# Patient Record
Sex: MALE | Race: WHITE | NOT HISPANIC OR LATINO | Employment: FULL TIME | URBAN - METROPOLITAN AREA
[De-identification: names, ages, dates, MRNs, and addresses within clinical notes are randomized per-mention and may not be internally consistent; named-entity substitution may affect disease eponyms.]

---

## 2017-01-10 ENCOUNTER — OFFICE VISIT (OUTPATIENT)
Dept: PSYCHOLOGY | Facility: CLINIC | Age: 32
End: 2017-01-10
Attending: FAMILY MEDICINE
Payer: COMMERCIAL

## 2017-01-10 DIAGNOSIS — F90.2 ADHD (ATTENTION DEFICIT HYPERACTIVITY DISORDER), COMBINED TYPE: Primary | ICD-10-CM

## 2017-01-10 PROCEDURE — 90834 PSYTX W PT 45 MINUTES: CPT | Performed by: PSYCHOLOGIST

## 2017-01-12 PROBLEM — F90.2 ADHD (ATTENTION DEFICIT HYPERACTIVITY DISORDER), COMBINED TYPE: Status: ACTIVE | Noted: 2017-01-12

## 2017-01-12 NOTE — PROGRESS NOTES
Progress Note - Initial Session  Disclaimer: This note consists of symbols derived from keyboarding, dictation and/or voice recognition software. As a result, there may be errors in the script that have gone undetected. Please consider this when interpreting information found in this chart.    Client Name:  Jona Zafar Date: 1/10/2017         Service Type: Individual      Session Start Time: 2:00 PM  Session End Time: 2:45 PM      Session Length: 38 - 52      Session #: 1     Attendees: Client attended alone         Diagnostic Assessment in progress.  Unable to complete documentation at the conclusion of the first session due to eating more information. ADHD assessment in process. Client presents for first interview for adult ADHD assessment. Client was initially diagnosed with ADHD at age 13. At that time he reported episodes of staring, disruptive behavior. He reported good response to medications however was only on them for approximately one month as his father did not want him on medications. Client has difficulty learning new things difficulty holding a job difficulty holding his temper.  Has trouble keeping track things like cell phones keys appointments.      Mental Status Assessment:  Appearance:   Appropriate   Eye Contact:   Good   Psychomotor Behavior: Hyperactive   Attitude:   Cooperative   Orientation:   All  Speech   Rate / Production: Normal    Volume:  Normal   Mood:    Normal Sad   Affect:    Appropriate   Thought Content:  Clear   Thought Form:  Coherent  Logical   Insight:    Fair       Safety Issues and Plan for Safety and Risk Management:  Client denies current fears or concerns for personal safety.  Client denies current or recent suicidal ideation or behaviors.  Client denies current or recent homicidal ideation or behaviors.  Client denies current or recent self injurious behavior or ideation.  Client denies other safety concerns.  A safety and risk management plan has  not been developed at this time, however client was given the after-hours number / 911 should there be a change in any of these risk factors.  Client reports there are no firearms in the house.      Diagnostic Criteria:  Combined Type       DSM5 Diagnoses: (Sustained by DSM5 Criteria Listed Above)  Diagnoses: Attention-Deficit/Hyperactivity Disorder  314.01 (F90.2) Combined presentation  Psychosocial & Contextual Factors: difficulty keeping ajob  WHODAS 2.0 (12 item)            This questionnaire asks about difficulties due to health conditions. Health conditions  include  disease or illnesses, other health problems that may be short or long lasting,  injuries, mental health or emotional problems, and problems with alcohol or drugs.                     Think back over the past 30 days and answer these questions, thinking about how much  difficulty you had doing the following activities. For each question, please [place] only  one response.    S1 Standing for long periods such as 30 minutes? None =         1   S2 Taking care of household responsibilities? Severe =       4   S3 Learning a new task, for example, learning how to get to a new place? Moderate =   3   S4 How much of a problem do you have joining community activities (for example, festivals, Mosque or other activities) in the same way as anyone else can? Severe =       4   S5 How much have you been emotionally affected by your health problems? Moderate =   3     In the past 30 days, how much difficulty did you have in:   S6 Concentrating on doing something for ten minutes? Moderate =   3   S7 Walking a long distance such as a kilometer (or equivalent)? Mild =           2   S8 Washing your whole body? None =         1   S9 Getting dressed? None =         1   S10 Dealing with people you do not know? Moderate =   3   S11 Maintaining a friendship? Mild =           2   S12 Your day to day work? Extreme / or cannot do = 5     H1 Overall, in the past 30 days,  how many days were these difficulties present? Record number of days 20   H2 In the past 30 days, for how many days were you totally unable to carry out your usual activities or work because of any health condition? Record number of days  25   H3 In the past 30 days, not counting the days that you were totally unable, for how many days did you cut back or reduce your usual activities or work because of any health condition? Record number of days 30       Collateral Reports Completed:  Not Applicable      PLAN: (Homework, other):  Client stated that he may follow up for ongoing services with [facility].  Second interview scheduled      Asael Covington

## 2017-01-17 ENCOUNTER — OFFICE VISIT (OUTPATIENT)
Dept: PSYCHOLOGY | Facility: CLINIC | Age: 32
End: 2017-01-17
Attending: FAMILY MEDICINE
Payer: COMMERCIAL

## 2017-01-17 DIAGNOSIS — F90.2 ADHD (ATTENTION DEFICIT HYPERACTIVITY DISORDER), COMBINED TYPE: Primary | ICD-10-CM

## 2017-01-17 PROCEDURE — 90791 PSYCH DIAGNOSTIC EVALUATION: CPT | Performed by: PSYCHOLOGIST
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
## 2017-02-06 ENCOUNTER — DOCUMENTATION ONLY (OUTPATIENT)
Dept: PSYCHOLOGY | Facility: CLINIC | Age: 32
End: 2017-02-06
Payer: COMMERCIAL

## 2017-02-06 DIAGNOSIS — F90.2 ADHD (ATTENTION DEFICIT HYPERACTIVITY DISORDER), COMBINED TYPE: Primary | ICD-10-CM

## 2017-02-06 PROCEDURE — 96101 HC PSYCHOLOGICAL TEST BY PSYCHOLOGIST/MD, PER HR: CPT | Performed by: PSYCHOLOGIST

## 2017-02-14 ENCOUNTER — OFFICE VISIT (OUTPATIENT)
Dept: PSYCHOLOGY | Facility: CLINIC | Age: 32
End: 2017-02-14
Payer: COMMERCIAL

## 2017-02-14 DIAGNOSIS — F90.0 ADHD (ATTENTION DEFICIT HYPERACTIVITY DISORDER), INATTENTIVE TYPE: Primary | ICD-10-CM

## 2017-02-14 PROCEDURE — 90834 PSYTX W PT 45 MINUTES: CPT | Performed by: PSYCHOLOGIST

## 2017-02-14 NOTE — Clinical Note
Looks like a pretty solid diagnosis of ADHD inattentive subtype to me. Fairly moderate symptoms severity. I don't know that stimulants would be of much benefit to him, he is not reporting much of a paradoxical response to caffeine or nicotine.

## 2017-02-14 NOTE — MR AVS SNAPSHOT
"                  MRN:[id number]                      After Visit Summary   2017    Jona Zafar    MRN: [id number]           Visit Information        Provider Department      2017 10:30 AM Asael Covington [facility] ADHD      Your next 10 appointments already scheduled     2017  7:20 AM CST   [facility]ter MD   [facility]    [address] [address]   [phone]              MyChart Information     [employer] lets you send messages to your doctor, view your test results, renew your prescriptions, schedule appointments and more. To sign up, go to [email or web address] . Click on \"Log in\" on the left side of the screen, which will take you to the Welcome page. Then click on \"Sign up Now\" on the right side of the page.     You will be asked to enter the access code listed below, as well as some personal information. Please follow the directions to create your username and password.     Your access code is: [email or web address]  Expires: 2017  5:01 PM     Your access code will  in 90 days. If you need help or a new code, please call your [facility] clinic or [phone].        Care EveryWhere ID     This is your Care EveryWhere ID. This could be used by other organizations to access your [facility] medical records  [id number]        "

## 2017-02-15 ENCOUNTER — FCC EXTENDED DOCUMENTATION (OUTPATIENT)
Dept: PSYCHOLOGY | Facility: CLINIC | Age: 32
End: 2017-02-15

## 2017-02-15 PROBLEM — F90.0 ADHD (ATTENTION DEFICIT HYPERACTIVITY DISORDER), INATTENTIVE TYPE: Status: ACTIVE | Noted: 2017-02-15

## 2017-02-15 PROBLEM — F90.2 ADHD (ATTENTION DEFICIT HYPERACTIVITY DISORDER), COMBINED TYPE: Status: RESOLVED | Noted: 2017-01-12 | Resolved: 2017-02-15

## 2017-02-15 NOTE — PROGRESS NOTES
Disclaimer: This note consists of symbols derived from keyboarding, dictation and/or voice recognition software. As a result, there may be errors in the script that have gone undetected. Please consider this when interpreting information found in this chart.    Progress Note    Client Name: Jona Zafar  Date: 2/14/2017         Service Type: Individual      Session Start Time: 10:30 AM  Session End Time: 11:15 AM      Session Length: 45     Session #: 3     Attendees: Client attended alone         DATA   ADHD Assessment feedback session. Reviewed results of testing. See [facility] extended documentation for results. Explained diagnosis and treatment options. Recommended medication evaluation be scheduled with PCP. Also provided and reviewed ADHD patient handout. Pt will schedule follow-up with me after seeing PCP.   Progress Since Last Session (Related to Symptoms / Goals / Homework):   Symptoms: Stable    Homework: Achieved / completed to satisfaction      Episode of Care Goals: Satisfactory progress - PREPARATION (Decided to change - considering how); Intervened by negotiating a change plan and determining options / strategies for behavior change, identifying triggers, exploring social supports, and working towards setting a date to begin behavior change     Current / Ongoing Stressors and Concerns:   Attention and concentration difficulties work and relationship stress     Treatment Objective(s) Addressed in This Session:   feedback session reviewing results of testing       Intervention:   psychoeducation        ASSESSMENT: Current Emotional / Mental Status (status of significant symptoms):   Risk status (Self / Other harm or suicidal ideation)   Client denies current fears or concerns for personal safety.   Client denies current or recent suicidal ideation or behaviors.   Client denies current or recent homicidal ideation or behaviors.   Client denies current or recent self  injurious behavior or ideation.   Client denies other safety concerns.   A safety and risk management plan has not been developed at this time, however client was given the after-hours number / 911 should there be a change in any of these risk factors.     Appearance:   Appropriate    Eye Contact:   Good    Psychomotor Behavior: Normal    Attitude:   Cooperative    Orientation:   All   Speech    Rate / Production: Normal     Volume:  Normal    Mood:    Normal   Affect:    Appropriate    Thought Content:  Clear    Thought Form:  Coherent  Logical    Insight:    Good      Medication Review:   No changes to current psychiatric medication(s)     Medication Compliance:   Yes     Changes in Health Issues:   None reported     Chemical Use Review:   Substance Use: Chemical use reviewed, no active concerns identified      Tobacco Use: No current tobacco use.       Collateral Reports Completed:   Not Applicable    PLAN: (Client Tasks / Therapist Tasks / Other)  Client to make medication evaluation appointment with primary physician, see me approximately 4-6 weeks after starting medications        Asael Covington

## 2017-02-15 NOTE — PROGRESS NOTES
[facility]  ADHD Evaluation  Disclaimer: This note consists of symbols derived from keyboarding, dictation and/or voice recognition software. As a result, there may be errors in the script that have gone undetected. Please consider this when interpreting information found in this chart.         Patient: Jona Zafar  YOB: 1985  MRN: [id number]  Date(s) of assessment:  Diagnostic Assessment January 10 and January 17, 2017 and Jr self-report and collateral measures scored and interpreted February 6, 2017 feedback session February 14, 2017    Information about appointment:  Client attended three sessions to aid in determining client's mental health diagnosis or diagnoses and treatment recommendations that best address client concerns. Client records includingprevious evaluations were reviewed. A diagnostic assessment was conducted at the initial appointment. Client completed several rating scales to assist in assessing attention-related and other mental health symptoms that may be causing impairments in functioning. Rating scales were also completed by a collateral contact.    Assessment tools:      Jr Adult ADHD Rating Scale-IV: Self and Other Reports (BAARS-IV), Jr Functional Impairment Scale: Self and Other Reports (BFIS), Jr Deficits in Executive Functioning Scale: Self and Other Reports (BDEFS), Patient Health Questionnaire-9 (PHQ-9) and Generalized Anxiety Disorder-7 (ALLEN-7)    Assessment Results:    Behavioral Observations:  Pleasant 31-year-old  male with a previous history of ADHD diagnosis in grade school. He is reasonably attentive during our sessions and on time.    Jr Adult ADHD Rating Scale-IV: Self and Other Reports (BAARS-IV)  The BAARS-IV assesses for symptoms of ADHD that are experienced in one's daily life. This assessment measure includes self and collateral rating scales designed to provide information regarding current and  "childhood symptoms of ADHD including inattention, hyperactivity, and impulsivity. Self-report scores are reported as percentiles. Scores at the 76th-83rd percentile are considered marginal, scores at the 84th-92nd percentile are considered borderline, scores at the 93rd-95th percentile are considered mild, scores at the 96th-98th percentile are considered moderate, and those at the 99th percentile are considered severe. Collateral or \"other\" rating scales are reported as number of symptoms observed in comparison to those reported by the client. Norms and percentile scores are not available for collateral reports.     Current Symptoms Scale--Self Report:   Client completed the self-report inventory of current symptoms. The results indicate that the client's Total ADHD Score was 47 which places him in the 97th percentile for overall ADHD symptoms. In addition, the client endorsed 8/9 (98th percentile) Inattention symptoms, 3/5 (93rd percentile) Hyperactivity /Impulsivity symptoms, and 8/9 (98th percentile) Sluggish Cognitive Tempo symptoms. Client indicated that the reported symptoms have resulted in impaired functioning in school, home, work and social relationships. Overall, the results suggest the client is experiencing moderate to severe ADHD symptoms.     Current Symptoms Scale--Other Report:  Client's significant other completed the collateral report inventory of current symptoms. Based on the collateral contact's observation of symptoms, the client demonstrates 6/9 Inattention symptoms, 2/5 Hyperactivity symptoms, 0/4 Impulsivity symptoms, and 7/9 Sluggish Cognitive Tempo symptoms. The client's Total ADHD Score was 38. The collateral contact indicated the client demonstrates impaired functioning in home, work and social relationships} The collateral- and self-report scores are not significantly different.     Childhood Symptoms Scale--Self-Report:  Client completed the self-report inventory of childhood " "symptoms. The results indicate that the client's Total ADHD Score was 54 which places him in the 97th percentile for overall ADHD symptoms in childhood. In addition, the client endorsed 8/9 (98th percentile) Inattention symptoms and  6/9 (95th percentile) Hyperactivity-Impulsivity symptoms. Client indicated that the reported symptoms resulted in impaired functioning in school, home and social relationships Overall, the results suggest the client experienced  Moderate symptoms of ADHD as a child.     Childhood Symptoms Scale--Other Report:  Client's mother completed the collateral report inventory of childhood symptoms. Based on the collateral contact's recollection of client's childhood symptoms, the client demonstrated 9/9 Inattention symptoms and 6/9 Hyperactivity-Impulsivity symptoms. The client's Total ADHD Score was  59. The collateral contact indicated the client demonstrates impaired functioning in school and homeThe collateral- and self-report scores are not significantly different. Collateral report is significantly higher in hyperactivity.                          Jr Functional Impairment Scale: Self and Other Reports (BFIS)  The BFIS is used to assess an individuals' psychosocial impairment in major life/daily activities that may be due to a mental health disorder. This assessment measure includes self and collateral rating scales. Self-report scores are reported as percentiles. Scores at the 76th-83rd percentile are considered marginal, scores at the 84th-92nd percentile are considered borderline, scores at the 93rd-95th percentile are considered mild, scores at the 96th-98th percentile are considered moderate, and those at the 99th percentile are considered severe.Collateral or \"other\" rating scales are reported as number of symptoms observed in comparison to those reported by the client. Norms and percentile scores are not available for collateral reports.     Results indicate the client " "identified impairment (scores at or greater than 93rd percentile) in the following areas: home-family, home-chores, work, social-strangers, social-friends, community activities, education, marriage/cohabiting/dating, money management, daily responsibilities and health maintenance The client's Mean Impairment Score was 7.07 (98th percentile) indicating the client is reporting Moderate impairment in functioning across domains. Client's significant other completed the collateral rating scale, which indicated similar results. The collateral contact's scores were generally lower than the client's report.     Jr Deficits in Executive Functioning Scale (BDEFS)  The BDEFS is a measure used for evaluating dimensions of adult executive functioning in daily life.This assessment measure includes self and collateral rating scales. Self-report scores are reported as percentiles. Scores at the 76th-83rd percentile are considered marginal, scores at the 84th-92nd percentile are considered borderline, scores at the 93rd-95th percentile are considered mild, scores at the 96th-98th percentile are considered moderate, and those at the 99th percentile are considered severe.Collateral or \"other\" rating scales are reported as number of symptoms observed in comparison to those reported by the client. Norms and percentile scores are not available for collateral reports.     Results indicate the client's Total Executive Functioning Score was 252  (98th percentile). The ADHD-Executive Functioning Index score was 30 (97thpercentile). These scores suggest the client has Moderate deficits in executive functioning. These deficits are likely to be due to ADHD. Results indicate the client identified significant deficits in the following areas: self-management to time 99th percentile , self-organization/problem-solving 97th percentile and self-motivation 96th percentile. Client's significant other completed the collateral rating scale, which " "indicated similar results. The collateral contact's scores were generally lower than the client's report.    Generalized Anxiety Disorder Questionnaire (ALLEN-7)  This questionnaire is designed to assess for anxiety in adults.  Based on the score, he is experiencing mild symptoms of anxiety. Client identified the following symptoms of anxiety: worrying about many different things, trouble relaxing and being restless    Patient Health Questionnaire- 9 (PHQ-9)   This questionnaire is designed to assess for depression in adults.  Based on the score, he is experiencing moderate symptoms of depression. Client identified the following symptoms of depression: depressed mood, lack of interest, difficulty with sleep, poor concentration and restlessness or lethargy.         Summary (based on clinical interview, review of records, test results):  A diagnosis of ADHD requires a persistent pattern of inattention and/or hyperactivity which interferes with functioning or development in multiple areas. Additionally, several symptoms need to be present before age 12. Although life stress and situations may make ADHD more or less problematic over the lifespan one can not \"catch\" ADHD later in life if there was no evidence of it in childhood.   The client's case there is clear evidence for both inattention and hyperactivity/impulsivity in childhood.  As happens with many individuals with such a childhood presentation the hyperactive elements have receded somewhat however the inattention difficulties remain. Plan also shows evidence of difficulties with executive functioning consistent with the diagnosis of ADHD. There is some associated depression  However this is moderate and likely to be the result of prolonged consequences of untreated ADHD..    DSM5 Diagnoses: (Sustained by DSM5 Criteria Listed Above)  Diagnoses: Attention-Deficit/Hyperactivity Disorder  314.00 (F90.0) Predominantly inattentive presentation; Major Depressive " Disorder, Recurrent Episode, Mild _; depression is reported is in remission  Psychosocial & Contextual Factors: difficult relationship with family of origin  WHODAS 2.0 (12 item)  This questionnaire asks about difficulties due to health conditions. Health conditions  Include disease or illnesses, other health problems that may be short or long lasting,  injuries, mental health or emotional problems, and problems with alcohol or drugs.      Think back over the past 30 days and answer these questions, thinking about how much  difficulty you had doing the following activities. For each question, please [place] only one  response.     S1 Standing for long periods such as 30 minutes? None = 1   S2 Taking care of household responsibilities? Severe = 4   S3 Learning a new task, for example, learning how to get to a new place? Moderate = 3   S4 How much of a problem do you have joining community activities (for example, festivals, [employer] day or other activities) in the same way as anyone else can? Severe = 4   S5 How much have you been emotionally affected by your health problems? Moderate = 3           In the past 30 days, how much difficulty did you have in:   S6 Concentrating on doing something for ten minutes? Moderate = 3   S7 Walking a long distance such as a kilometer (or equivalent)? Mild = 2   S8 Washing your whole body? None = 1   S9 Getting dressed? None = 1   S10 Dealing with people you do not know? Moderate = 3   S11 Maintaining a friendship? Mild = 2   S12 Your day to day work? Extreme / or cannot do = 5      H1 Overall, in the past 30 days, how many days were these difficulties present? Record number of days 20   H2 In the past 30 days, for how many days were you totally unable to carry out your usual activities or work because of any health condition? Record number of days 25   H3 In the past 30 days, not counting the days that you were totally unable, for how many days did you cut back or reduce your usual  activities or work because of any health condition? Record number of days 30             Recommendations:    Schedule an appointment with your physician to discuss a medication evaluation., Access resources through websites, books, and articles such as those provided  in the handout., Consider working with an ADHD  or individual therapist to learn skills to  assist with symptom management, as well as ways to improve relationships,  etc that may have been impacted by your symptoms. , Consider attending workshops or support groups through [email or web address] and Schedule a follow-up appointment with me in about six weeks to review symptoms, treatment involvement, and struggles and/or successes.     Given the predominantly inattentive presentation  of ADHD and this client medications other than stimulants might be appropriate.    Asael Covington

## 2017-02-17 ENCOUNTER — OFFICE VISIT (OUTPATIENT)
Dept: FAMILY MEDICINE | Facility: CLINIC | Age: 32
End: 2017-02-17
Payer: COMMERCIAL

## 2017-02-17 VITALS
BODY MASS INDEX: 26.17 KG/M2 | HEART RATE: 62 BPM | WEIGHT: 182.8 LBS | HEIGHT: 70 IN | SYSTOLIC BLOOD PRESSURE: 140 MMHG | DIASTOLIC BLOOD PRESSURE: 78 MMHG | OXYGEN SATURATION: 100 % | TEMPERATURE: 97.5 F

## 2017-02-17 DIAGNOSIS — F32.5 MAJOR DEPRESSIVE DISORDER WITH SINGLE EPISODE, IN FULL REMISSION (H): ICD-10-CM

## 2017-02-17 DIAGNOSIS — F90.0 ADHD (ATTENTION DEFICIT HYPERACTIVITY DISORDER), INATTENTIVE TYPE: Primary | ICD-10-CM

## 2017-02-17 PROCEDURE — 99214 OFFICE O/P EST MOD 30 MIN: CPT | Performed by: FAMILY MEDICINE

## 2017-02-17 RX ORDER — DEXTROAMPHETAMINE SACCHARATE, AMPHETAMINE ASPARTATE, DEXTROAMPHETAMINE SULFATE AND AMPHETAMINE SULFATE 2.5; 2.5; 2.5; 2.5 MG/1; MG/1; MG/1; MG/1
10 TABLET ORAL 2 TIMES DAILY
Qty: 60 TABLET | Refills: 0 | Status: SHIPPED | OUTPATIENT
Start: 2017-02-17 | End: 2017-03-17

## 2017-02-17 NOTE — NURSING NOTE
"Chief Complaint   Patient presents with     YOMIHAMILCAR     recently seen by Dr. Covington, wants to discuss further treatment from here       Initial /78 (Cuff Size: Adult Regular)  Pulse 62  Temp 97.5  F (36.4  C) (Tympanic)  Ht 5' 10\" (1.778 m)  Wt 182 lb 12.8 oz (82.9 kg)  SpO2 100%  BMI 26.23 kg/m2 Estimated body mass index is 26.23 kg/(m^2) as calculated from the following:    Height as of this encounter: 5' 10\" (1.778 m).    Weight as of this encounter: 182 lb 12.8 oz (82.9 kg).  Medication Reconciliation: complete  "

## 2017-02-17 NOTE — MR AVS SNAPSHOT
After Visit Summary   2/17/2017    Jona Zafar    MRN: [id number]           Patient Information     Date Of Birth          1985        Visit Information        Provider Department      2/17/2017 7:20 AM Mick Richter MD [facility]        Today's Diagnoses     ADHD (attention deficit hyperactivity disorder), inattentive type    -  1      Care Instructions      Start ADHD Adderall medication one pill in the morning and one when you notice it wearing off about noon.    Next clinic visit with Dr. Richter in 3-4 weeks    Thank you for choosing [facility].  You may be receiving a survey in the mail from [employer] regarding your visit today.  Please take a few minutes to complete and return the survey to let us know how we are doing.      If you have questions or concerns, please contact us via [employer] or you can contact your care team at [phone].    Our Clinic hours are:  Monday 6:40 am  to 7:00 pm  Tuesday -Friday 6:40 am to 5:00 pm    The [place] outpatient lab hours are:  Monday - Friday 6:10 am to 4:45 pm  Saturdays 7:00 am to 11:00 am  Appointments are required, call [phone]    If you have clinical questions after hours or would like to schedule an appointment,  call the clinic at [phone].        Follow-ups after your visit        Who to contact     If you have questions or need follow up information about today's clinic visit or your schedule please contact [facility] directly at [phone].  Normal or non-critical lab and imaging results will be communicated to you by [employer], letter or phone within 4 business days after the clinic has received the results. If you do not hear from us within 7 days, please contact the clinic through [employer] or phone. If you have a critical or abnormal lab result, we will notify you by phone as soon as possible.  Submit refill requests through [employer] or call your pharmacy and they will forward the refill  "request to us. Please allow 3 business days for your refill to be completed.          Additional Information About Your Visit        [employer] Information     [employer] lets you send messages to your doctor, view your test results, renew your prescriptions, schedule appointments and more. To sign up, go to [email or web address] . Click on \"Log in\" on the left side of the screen, which will take you to the Welcome page. Then click on \"Sign up Now\" on the right side of the page.     You will be asked to enter the access code listed below, as well as some personal information. Please follow the directions to create your username and password.     Your access code is: [email or web address]  Expires: 2017  5:01 PM     Your access code will  in 90 days. If you need help or a new code, please call your [place] clinic or [phone].        Care EveryWhere ID     This is your Care EveryWhere ID. This could be used by other organizations to access your [place] medical records  [id number]        Your Vitals Were     Pulse Temperature Height Pulse Oximetry BMI (Body Mass Index)       62 97.5  F (36.4  C) (Tympanic) 5' 10\" (1.778 m) 100% 26.23 kg/m2        Blood Pressure from Last 3 Encounters:   17 140/78   10/14/16 129/76   16 131/74    Weight from Last 3 Encounters:   17 182 lb 12.8 oz (82.9 kg)   10/14/16 185 lb 12.8 oz (84.3 kg)   16 186 lb 9.6 oz (84.6 kg)              Today, you had the following     No orders found for display         Today's Medication Changes          These changes are accurate as of: 17  8:06 AM.  If you have any questions, ask your nurse or doctor.               Start taking these medicines.        Dose/Directions    amphetamine-dextroamphetamine 10 MG per tablet   Commonly known as:  ADDERALL   Used for:  ADHD (attention deficit hyperactivity disorder), inattentive type   Started by:  Mick Richter MD        Dose:  10 mg   Take 1 tablet (10 mg) by mouth 2 " times daily   Quantity:  60 tablet   Refills:  0            Where to get your medicines      Some of these will need a paper prescription and others can be bought over the counter.  Ask your nurse if you have questions.     Bring a paper prescription for each of these medications     amphetamine-dextroamphetamine 10 MG per tablet                Primary Care Provider Office Phone # Fax #    Mick Richter [phone]       [facility] [address] [address]        Thank you!     Thank you for choosing [facility]  for your care. Our goal is always to provide you with excellent care. Hearing back from our patients is one way we can continue to improve our services. Please take a few minutes to complete the written survey that you may receive in the mail after your visit with us. Thank you!             Your Updated Medication List - Protect others around you: Learn how to safely use, store and throw away your medicines at www.disposemymeds.org.          This list is accurate as of: 2/17/17  8:06 AM.  Always use your most recent med list.                   Brand Name Dispense Instructions for use    amphetamine-dextroamphetamine 10 MG per tablet    ADDERALL    60 tablet    Take 1 tablet (10 mg) by mouth 2 times daily

## 2017-02-17 NOTE — PROGRESS NOTES
"  SUBJECTIVE:                                                    Jona Zafar is a 31 year old male who presents to clinic today for the following health issues:      Chief Complaint   Patient presents with     A.PHILH.FAVIAN     recently seen by Dr. Covington, wants to discuss further treatment from here     Did go through ADHD eval 2/14/17 with Dr. Covington and diagnosed with ADHD and medication recommended.   Symptoms of ADHD around age 10 and diagnosed but only on medications for few weeks and dad didn't want him on medication.       Problem list and histories reviewed & adjusted, as indicated.  Additional history: as documented    Problem list, Medication list, Allergies, and Medical/Social/Surgical histories [facility] and updated as appropriate.    ROS:  C: NEGATIVE for fever, chills, change in weight  E/M: NEGATIVE for ear, mouth and throat problems  R: NEGATIVE for significant cough or SOB  CV: NEGATIVE for chest pain, palpitations or peripheral edema    OBJECTIVE:                                                    /78 (Cuff Size: Adult Regular)  Pulse 62  Temp 97.5  F (36.4  C) (Tympanic)  Ht 5' 10\" (1.778 m)  Wt 182 lb 12.8 oz (82.9 kg)  SpO2 100%  BMI 26.23 kg/m2  Body mass index is 26.23 kg/(m^2).  GENERAL: healthy, alert and no distress  CV: regular rate and rhythm, normal S1 S2, no S3 or S4, no murmur, click or rub, no peripheral edema and peripheral pulses strong  PSYCH: mentation appears normal, affect normal/bright    Diagnostic Test Results:  none      ASSESSMENT/PLAN:                                                        Jona was seen today for a.d.h.favian.    Diagnoses and all orders for this visit:    ADHD (attention deficit hyperactivity disorder), inattentive type: new diagnosis with Asael Covington  --discussed risks, benefits, and side effects of this new medication. Patient understands and is in agreement.    -     amphetamine-dextroamphetamine (ADDERALL) 10 MG per tablet; Take 1 tablet (10 " mg) by mouth 2 times daily    Major Depression in full remission: stable mood    Next clinic visit in 3-4 weeks    Mick Richter MD  [facility]

## 2017-02-17 NOTE — PATIENT INSTRUCTIONS
Start ADHD Adderall medication one pill in the morning and one when you notice it wearing off about noon.    Next clinic visit with Dr. Richter in 3-4 weeks    Thank you for choosing [facility].  You may be receiving a survey in the mail from Rachel Figueroa regarding your visit today.  Please take a few minutes to complete and return the survey to let us know how we are doing.      If you have questions or concerns, please contact us via [employer] or you can contact your care team at [phone].    Our Clinic hours are:  Monday 6:40 am  to 7:00 pm  Tuesday -Friday 6:40 am to 5:00 pm    The [place] outpatient lab hours are:  Monday - Friday 6:10 am to 4:45 pm  Saturdays 7:00 am to 11:00 am  Appointments are required, call [phone]    If you have clinical questions after hours or would like to schedule an appointment,  call the clinic at [phone].

## 2017-03-01 ENCOUNTER — TELEPHONE (OUTPATIENT)
Dept: FAMILY MEDICINE | Facility: CLINIC | Age: 32
End: 2017-03-01

## 2017-03-01 NOTE — TELEPHONE ENCOUNTER
Dr. Richter,    Patient calling about adjusting his adderall dose.  Patient seen in office on 2/17/17.  Just started medication then.  Patient states he gets side tracked easily.  Seems to be worn off by 10 a.m. Takes his 2nd dose at noon.  Helps but not like it was in the beginning.  Patient is watching his diet, has no counselor.  Has an appt on 3/17/17 is wondering if you can make adjustment before hand.  Please advise. Reina MORSE RN

## 2017-03-01 NOTE — TELEPHONE ENCOUNTER
Reason for Call:  Other medication adjustment    Detailed comments: pt stated that he is wondering if he needs to adjust his dose higher. He states that if feels like it wears off too soon and he has trouble staying on task. He has noticed this in the last 2-3 days.    Phone Number Patient can be reached at: Home number on file [phone] (home)    Best Time: any    Can we leave a detailed message on this number? YES    Call taken on 3/1/2017 at 3:33 PM by Josie Vargas

## 2017-03-17 ENCOUNTER — OFFICE VISIT (OUTPATIENT)
Dept: FAMILY MEDICINE | Facility: CLINIC | Age: 32
End: 2017-03-17
Payer: COMMERCIAL

## 2017-03-17 VITALS
HEART RATE: 58 BPM | SYSTOLIC BLOOD PRESSURE: 129 MMHG | BODY MASS INDEX: 26.2 KG/M2 | WEIGHT: 183 LBS | TEMPERATURE: 98 F | HEIGHT: 70 IN | DIASTOLIC BLOOD PRESSURE: 76 MMHG | OXYGEN SATURATION: 100 %

## 2017-03-17 DIAGNOSIS — F90.0 ADHD (ATTENTION DEFICIT HYPERACTIVITY DISORDER), INATTENTIVE TYPE: ICD-10-CM

## 2017-03-17 DIAGNOSIS — Z71.6 ENCOUNTER FOR SMOKING CESSATION COUNSELING: Primary | ICD-10-CM

## 2017-03-17 PROCEDURE — 99214 OFFICE O/P EST MOD 30 MIN: CPT | Performed by: FAMILY MEDICINE

## 2017-03-17 RX ORDER — DEXTROAMPHETAMINE SACCHARATE, AMPHETAMINE ASPARTATE, DEXTROAMPHETAMINE SULFATE AND AMPHETAMINE SULFATE 3.75; 3.75; 3.75; 3.75 MG/1; MG/1; MG/1; MG/1
15 TABLET ORAL 2 TIMES DAILY
Qty: 60 TABLET | Refills: 0 | Status: SHIPPED | OUTPATIENT
Start: 2017-03-17 | End: 2017-04-18

## 2017-03-17 RX ORDER — NICOTINE 21 MG/24HR
1 PATCH, TRANSDERMAL 24 HOURS TRANSDERMAL EVERY 24 HOURS
Qty: 21 PATCH | Refills: 0 | Status: SHIPPED | OUTPATIENT
Start: 2017-04-14 | End: 2017-10-13

## 2017-03-17 RX ORDER — NICOTINE 21 MG/24HR
1 PATCH, TRANSDERMAL 24 HOURS TRANSDERMAL EVERY 24 HOURS
Qty: 28 PATCH | Refills: 0 | Status: SHIPPED | OUTPATIENT
Start: 2017-03-17 | End: 2017-04-14

## 2017-03-17 NOTE — PROGRESS NOTES
"  SUBJECTIVE:                                                    Jona Zafar is a 31 year old male who presents to clinic today for the following health issues:      Medication Followup of Adderall    Taking Medication as prescribed: yes    Side Effects:  None    Medication Helping Symptoms:  Does help but may need to have an increase in dosage   Did notice improvement with starting adderall 10mg BID, will catch himself getting distracted.    Smoking: has cut down to 1/2 to 3/4 pack per day  Has quit once for 3 years cold turkey.     Problem list and histories reviewed & adjusted, as indicated.  Additional history: as documented      Reviewed and updated as needed this visit by clinical staff  Tobacco  Allergies  Med Hx  Surg Hx  Fam Hx  Soc Hx      Reviewed and updated as needed this visit by Provider              Review of Systems:   EYES: no acute vision problems or changes  CV: no chest pain, no palpitations, no new or worsening peripheral edema  GI: no nausea, no vomiting, no constipation, no diarrhea  NEURO: no weakness, no dizziness, no syncope, no headaches, no tremors, no tics  PSYCHIATRIC: NEGATIVE for changes in mood or affect  CONSTITUTIONAL: no fever, no chills, no fatigue, no unexpected change in weight, no appetite changes.  SKIN: no worrisome rashes, no worrisome lesions       Current Outpatient Prescriptions   Medication Sig Dispense Refill     amphetamine-dextroamphetamine (ADDERALL) 15 MG per tablet Take 1 tablet (15 mg) by mouth 2 times daily 60 tablet 0      No Known Allergies       Physical Exam:     Vitals:    03/17/17 1303   BP: 129/76   Pulse: 58   Temp: 98  F (36.7  C)   TempSrc: Tympanic   SpO2: 100%   Weight: 183 lb (83 kg)   Height: 5' 10\" (1.778 m)     Body mass index is 26.26 kg/(m^2).  GENERAL:: healthy, alert and no distress  CV: regular rates and rhythm, normal S1 S2, no S3 or S4 and no murmur, no click or rub   NEURO: strength and tone- normal, sensory exam- grossly normal, " no tremor.  PSYCH: Alert and oriented times 3; speech- coherent , normal rate and volume; able to articulate logical thoughts, able to abstract reason, no tangential thoughts, no hallucinations or delusions, affect- normal  MENTAL STATUS EXAM:  Appearance/Behavior: No apparent distress, Casually groomed and Appears stated age  Speech: Normal  Mood/Affect: normal affect  Insight: Adequate  Assessment and Plan   Jona was seen today for recheck medication.    Diagnoses and all orders for this visit:    Encounter for smoking cessation counseling  -     nicotine (NICODERM CQ) 21 MG/24HR 24 hr patch; Place 1 patch onto the skin every 24 hours for 28 days  -     nicotine (NICODERM CQ) 14 MG/24HR 24 hr patch; Place 1 patch onto the skin every 24 hours  -     nicotine polacrilex (NICORETTE) 2 MG gum; Place 1 each (2 mg) inside cheek as needed for smoking cessation    ADHD (attention deficit hyperactivity disorder), inattentive type: improving control  -increase adderall to 15mg BID up from 10mg BID  -     amphetamine-dextroamphetamine (ADDERALL) 15 MG per tablet; Take 1 tablet (15 mg) by mouth 2 times daily        Options for treatment and follow-up care were reviewed with the patient and/or guardian. Jona SHERMAN Charisma and/or guardian engaged in the decision making process and verbalized understanding of the options discussed and agreed with the final plan.    Mick Richter MD  [facility]

## 2017-03-17 NOTE — MR AVS SNAPSHOT
After Visit Summary   3/17/2017    Jona Zafar    MRN: [id number]           Patient Information     Date Of Birth          1985        Visit Information        Provider Department      3/17/2017 1:00 PM Mick Richter MD [facility]        Today's Diagnoses     Encounter for smoking cessation counseling    -  1    ADHD (attention deficit hyperactivity disorder), inattentive type          Care Instructions      Can check with Quit Plan to see what options there are for the patch or the gum    Pick a quit date, tell everyone  The night you get rid of all smoking stuff  That night or next morning put on the patch  Use the gum if needed like at night        Thank you for choosing [facility].  You may be receiving a survey in the mail from [facility] regarding your visit today.  Please take a few minutes to complete and return the survey to let us know how we are doing.      If you have questions or concerns, please contact us via [employer] or you can contact your care team at [phone].    Our Clinic hours are:  Monday 6:40 am  to 7:00 pm  Tuesday -Friday 6:40 am to 5:00 pm    The [place] outpatient lab hours are:  Monday - Friday 6:10 am to 4:45 pm  Saturdays 7:00 am to 11:00 am  Appointments are required, call [phone]    If you have clinical questions after hours or would like to schedule an appointment,  call the clinic at [phone].        Follow-ups after your visit        Who to contact     If you have questions or need follow up information about today's clinic visit or your schedule please contact [facility] directly at [phone].  Normal or non-critical lab and imaging results will be communicated to you by MyChart, letter or phone within 4 business days after the clinic has received the results. If you do not hear from us within 7 days, please contact the clinic through [employer] or phone. If you have a critical or abnormal lab result, we  "will notify you by phone as soon as possible.  Submit refill requests through [employer] or call your pharmacy and they will forward the refill request to us. Please allow 3 business days for your refill to be completed.          Additional Information About Your Visit        [employer] Information     [employer] lets you send messages to your doctor, view your test results, renew your prescriptions, schedule appointments and more. To sign up, go to [email or web address] . Click on \"Log in\" on the left side of the screen, which will take you to the Welcome page. Then click on \"Sign up Now\" on the right side of the page.     You will be asked to enter the access code listed below, as well as some personal information. Please follow the directions to create your username and password.     Your access code is: [email or web address]  Expires: 2017  6:01 PM     Your access code will  in 90 days. If you need help or a new code, please call your [place] clinic or [phone].        Care EveryWhere ID     This is your Care EveryWhere ID. This could be used by other organizations to access your [place] medical records  [id number]        Your Vitals Were     Pulse Temperature Height Pulse Oximetry BMI (Body Mass Index)       58 98  F (36.7  C) (Tympanic) 5' 10\" (1.778 m) 100% 26.26 kg/m2        Blood Pressure from Last 3 Encounters:   17 129/76   17 140/78   10/14/16 129/76    Weight from Last 3 Encounters:   17 183 lb (83 kg)   17 182 lb 12.8 oz (82.9 kg)   10/14/16 185 lb 12.8 oz (84.3 kg)              Today, you had the following     No orders found for display         Today's Medication Changes          These changes are accurate as of: 3/17/17  1:29 PM.  If you have any questions, ask your nurse or doctor.               Start taking these medicines.        Dose/Directions    amphetamine-dextroamphetamine 15 MG per tablet   Commonly known as:  ADDERALL   Used for:  ADHD (attention deficit " hyperactivity disorder), inattentive type   Replaces:  amphetamine-dextroamphetamine 10 MG per tablet   Started by:  Mick Richter MD        Dose:  15 mg   Take 1 tablet (15 mg) by mouth 2 times daily   Quantity:  60 tablet   Refills:  0       * nicotine 21 MG/24HR 24 hr patch   Commonly known as:  NICODERM CQ   Used for:  Encounter for smoking cessation counseling   Started by:  Mick Richter MD        Dose:  1 patch   Place 1 patch onto the skin every 24 hours for 28 days   Quantity:  28 patch   Refills:  0       * nicotine 14 MG/24HR 24 hr patch   Commonly known as:  NICODERM CQ   Used for:  Encounter for smoking cessation counseling   Started by:  Mick Richter MD        Dose:  1 patch   Start taking on:  4/14/2017   Place 1 patch onto the skin every 24 hours   Quantity:  21 patch   Refills:  0       nicotine polacrilex 2 MG gum   Commonly known as:  NICORETTE   Used for:  Encounter for smoking cessation counseling   Started by:  Mick Richter MD        Dose:  2 mg   Place 1 each (2 mg) inside cheek as needed for smoking cessation   Quantity:  100 tablet   Refills:  0       * Notice:  This list has 2 medication(s) that are the same as other medications prescribed for you. Read the directions carefully, and ask your doctor or other care provider to review them with you.      Stop taking these medicines if you haven't already. Please contact your care team if you have questions.     amphetamine-dextroamphetamine 10 MG per tablet   Commonly known as:  ADDERALL   Replaced by:  amphetamine-dextroamphetamine 15 MG per tablet   Stopped by:  Mick Richter MD                Where to get your medicines      These medications were sent to [facility] [place], [place] - [address]  [address] [address]     Phone:  [phone]     nicotine 14 MG/24HR 24 hr patch    nicotine 21 MG/24HR 24 hr patch    nicotine polacrilex 2 MG gum         Some of these will  need a paper prescription and others can be bought over the counter.  Ask your nurse if you have questions.     Bring a paper prescription for each of these medications     amphetamine-dextroamphetamine 15 MG per tablet                Primary Care Provider Office Phone # Fax #    Mick Richter [phone]       [facility] [address] [address]        Thank you!     Thank you for choosing [facility]  for your care. Our goal is always to provide you with excellent care. Hearing back from our patients is one way we can continue to improve our services. Please take a few minutes to complete the written survey that you may receive in the mail after your visit with us. Thank you!             Your Updated Medication List - Protect others around you: Learn how to safely use, store and throw away your medicines at www.disposemymeds.org.          This list is accurate as of: 3/17/17  1:29 PM.  Always use your most recent med list.                   Brand Name Dispense Instructions for use    amphetamine-dextroamphetamine 15 MG per tablet    ADDERALL    60 tablet    Take 1 tablet (15 mg) by mouth 2 times daily       * nicotine 21 MG/24HR 24 hr patch    NICODERM CQ    28 patch    Place 1 patch onto the skin every 24 hours for 28 days       * nicotine 14 MG/24HR 24 hr patch   Start taking on:  4/14/2017    NICODERM CQ    21 patch    Place 1 patch onto the skin every 24 hours       nicotine polacrilex 2 MG gum    NICORETTE    100 tablet    Place 1 each (2 mg) inside cheek as needed for smoking cessation       * Notice:  This list has 2 medication(s) that are the same as other medications prescribed for you. Read the directions carefully, and ask your doctor or other care provider to review them with you.

## 2017-03-17 NOTE — NURSING NOTE
"Chief Complaint   Patient presents with     Recheck Medication     Adderall; wants to discuss dosage - may need to be increased       Initial /76  Pulse 58  Temp 98  F (36.7  C) (Tympanic)  Ht 5' 10\" (1.778 m)  Wt 183 lb (83 kg)  SpO2 100%  BMI 26.26 kg/m2 Estimated body mass index is 26.26 kg/(m^2) as calculated from the following:    Height as of this encounter: 5' 10\" (1.778 m).    Weight as of this encounter: 183 lb (83 kg).  Medication Reconciliation: complete  "

## 2017-04-18 ENCOUNTER — OFFICE VISIT (OUTPATIENT)
Dept: FAMILY MEDICINE | Facility: CLINIC | Age: 32
End: 2017-04-18
Payer: COMMERCIAL

## 2017-04-18 VITALS
TEMPERATURE: 98.4 F | HEART RATE: 60 BPM | BODY MASS INDEX: 26.71 KG/M2 | HEIGHT: 70 IN | WEIGHT: 186.6 LBS | OXYGEN SATURATION: 98 % | SYSTOLIC BLOOD PRESSURE: 128 MMHG | DIASTOLIC BLOOD PRESSURE: 74 MMHG

## 2017-04-18 DIAGNOSIS — F32.5 MAJOR DEPRESSIVE DISORDER WITH SINGLE EPISODE, IN FULL REMISSION (H): ICD-10-CM

## 2017-04-18 DIAGNOSIS — F90.0 ADHD (ATTENTION DEFICIT HYPERACTIVITY DISORDER), INATTENTIVE TYPE: Primary | ICD-10-CM

## 2017-04-18 PROCEDURE — 99213 OFFICE O/P EST LOW 20 MIN: CPT | Performed by: FAMILY MEDICINE

## 2017-04-18 RX ORDER — DEXTROAMPHETAMINE SACCHARATE, AMPHETAMINE ASPARTATE, DEXTROAMPHETAMINE SULFATE AND AMPHETAMINE SULFATE 3.75; 3.75; 3.75; 3.75 MG/1; MG/1; MG/1; MG/1
15 TABLET ORAL 2 TIMES DAILY
Qty: 60 TABLET | Refills: 0 | Status: CANCELLED | OUTPATIENT
Start: 2017-04-18

## 2017-04-18 RX ORDER — DEXTROAMPHETAMINE SACCHARATE, AMPHETAMINE ASPARTATE, DEXTROAMPHETAMINE SULFATE AND AMPHETAMINE SULFATE 3.75; 3.75; 3.75; 3.75 MG/1; MG/1; MG/1; MG/1
15 TABLET ORAL 2 TIMES DAILY
Qty: 60 TABLET | Refills: 0 | Status: SHIPPED | OUTPATIENT
Start: 2017-05-19 | End: 2017-06-18

## 2017-04-18 RX ORDER — DEXTROAMPHETAMINE SACCHARATE, AMPHETAMINE ASPARTATE, DEXTROAMPHETAMINE SULFATE AND AMPHETAMINE SULFATE 3.75; 3.75; 3.75; 3.75 MG/1; MG/1; MG/1; MG/1
15 TABLET ORAL 2 TIMES DAILY
Qty: 60 TABLET | Refills: 0 | Status: SHIPPED | OUTPATIENT
Start: 2017-06-19 | End: 2017-07-19

## 2017-04-18 RX ORDER — DEXTROAMPHETAMINE SACCHARATE, AMPHETAMINE ASPARTATE, DEXTROAMPHETAMINE SULFATE AND AMPHETAMINE SULFATE 3.75; 3.75; 3.75; 3.75 MG/1; MG/1; MG/1; MG/1
15 TABLET ORAL 2 TIMES DAILY
Qty: 60 TABLET | Refills: 0 | Status: SHIPPED | OUTPATIENT
Start: 2017-04-18 | End: 2017-05-18

## 2017-04-18 NOTE — PATIENT INSTRUCTIONS
Next clinic visit in 3 months for next ADHD refills    Set another quit date to quit smoking.      Thank you for choosing [facility].  You may be receiving a survey in the mail from Rachel Figueroa regarding your visit today.  Please take a few minutes to complete and return the survey to let us know how we are doing.      If you have questions or concerns, please contact us via [employer] or you can contact your care team at [phone].    Our Clinic hours are:  Monday 6:40 am  to 7:00 pm  Tuesday -Friday 6:40 am to 5:00 pm    The [place] outpatient lab hours are:  Monday - Friday 6:10 am to 4:45 pm  Saturdays 7:00 am to 11:00 am  Appointments are required, call [phone]    If you have clinical questions after hours or would like to schedule an appointment,  call the clinic at [phone].

## 2017-04-18 NOTE — NURSING NOTE
"Chief Complaint   Patient presents with     Recheck Medication       Initial /74  Pulse 60  Temp 98.4  F (36.9  C) (Tympanic)  Ht 5' 10\" (1.778 m)  Wt 186 lb 9.6 oz (84.6 kg)  SpO2 98%  BMI 26.77 kg/m2 Estimated body mass index is 26.77 kg/(m^2) as calculated from the following:    Height as of this encounter: 5' 10\" (1.778 m).    Weight as of this encounter: 186 lb 9.6 oz (84.6 kg).  Medication Reconciliation: complete  "

## 2017-04-18 NOTE — PROGRESS NOTES
"  SUBJECTIVE:                                                    Jona Zafar is a 32 year old male who presents to clinic today for the following health issues:      Medication Followup of Adderall    Taking Medication as prescribed: yes    Side Effects:  None    Medication Helping Symptoms:  yes     At last visit increased from 10 up to 15mg BID.   Did go through ADHD eval 2/14/17 with Dr. Covington and diagnosed with ADHD and medication recommended.   Symptoms of ADHD around age 10 and diagnosed but only on medications for few weeks and dad didn't want him on medication.     Problem list and histories reviewed & adjusted, as indicated.  Additional history: as documented    Reviewed and updated as needed this visit by clinical staff  Tobacco  Allergies  Med Hx  Surg Hx  Fam Hx  Soc Hx      Reviewed and updated as needed this visit by Provider         ROS:  C: NEGATIVE for fever, chills, change in weight, no appetite changes  E/M: NEGATIVE for ear, mouth and throat problems  R: NEGATIVE for significant cough or SOB  CV: NEGATIVE for chest pain, palpitations or peripheral edema  NEURO: no tremor or tics.    OBJECTIVE:                                                    /74  Pulse 60  Temp 98.4  F (36.9  C) (Tympanic)  Ht 5' 10\" (1.778 m)  Wt 186 lb 9.6 oz (84.6 kg)  SpO2 98%  BMI 26.77 kg/m2  Body mass index is 26.77 kg/(m^2).  GENERAL: healthy, alert and no distress  CV: regular rate and rhythm, normal S1 S2, no S3 or S4, no murmur, click or rub, no peripheral edema and peripheral pulses strong  NEURO: Normal strength and tone, mentation intact and speech normal  PSYCH: mentation appears normal, affect normal/bright    Diagnostic Test Results:  none      ASSESSMENT/PLAN:                                                        Jona was seen today for recheck medication.    Diagnoses and all orders for this visit:    ADHD (attention deficit hyperactivity disorder), inattentive type  -     " amphetamine-dextroamphetamine (ADDERALL) 15 MG per tablet; Take 1 tablet (15 mg) by mouth 2 times daily  -     amphetamine-dextroamphetamine (ADDERALL) 15 MG per tablet; Take 1 tablet (15 mg) by mouth 2 times daily  -     amphetamine-dextroamphetamine (ADDERALL) 15 MG per tablet; Take 1 tablet (15 mg) by mouth 2 times daily    Major depressive disorder with single episode, in full remission (H): well controlled off zoloft (had low libido side effects and now that ADHD controlled, mood improved too)      Patient Instructions   Next clinic visit in 3 months for next ADHD refills    Set another quit date to quit smoking.        Mick Richter MD  [facility]

## 2017-04-18 NOTE — MR AVS SNAPSHOT
After Visit Summary   4/18/2017    Jona Zafar    MRN: [id number]           Patient Information     Date Of Birth          1985        Visit Information        Provider Department      4/18/2017 9:40 AM Mick Richter MD [facility]        Today's Diagnoses     ADHD (attention deficit hyperactivity disorder), inattentive type    -  1    Major depressive disorder with single episode, in full remission (H)          Care Instructions    Next clinic visit in 3 months for next ADHD refills    Set another quit date to quit smoking.      Thank you for choosing [facility].  You may be receiving a survey in the mail from [employer] regarding your visit today.  Please take a few minutes to complete and return the survey to let us know how we are doing.      If you have questions or concerns, please contact us via Teabox or you can contact your care team at [phone].    Our Clinic hours are:  Monday 6:40 am  to 7:00 pm  Tuesday -Friday 6:40 am to 5:00 pm    The [place] outpatient lab hours are:  Monday - Friday 6:10 am to 4:45 pm  Saturdays 7:00 am to 11:00 am  Appointments are required, call [phone]    If you have clinical questions after hours or would like to schedule an appointment,  call the clinic at [phone].        Follow-ups after your visit        Who to contact     If you have questions or need follow up information about today's clinic visit or your schedule please contact [facility] directly at [phone].  Normal or non-critical lab and imaging results will be communicated to you by [employer], letter or phone within 4 business days after the clinic has received the results. If you do not hear from us within 7 days, please contact the clinic through "[employer]t or phone. If you have a critical or abnormal lab result, we will notify you by phone as soon as possible.  Submit refill requests through Teabox or call your pharmacy and they will  "forward the refill request to us. Please allow 3 business days for your refill to be completed.          Additional Information About Your Visit        Un[employer]xd[employer]-Sec[employer] Information     [employer] lets you send messages to your doctor, view your test results, renew your prescriptions, schedule appointments and more. To sign up, go to [email or web address] . Click on \"Log in\" on the left side of the screen, which will take you to the Welcome page. Then click on \"Sign up Now\" on the right side of the page.     You will be asked to enter the access code listed below, as well as some personal information. Please follow the directions to create your username and password.     Your access code is: [email or web address]  Expires: 2017  6:01 PM     Your access code will  in 90 days. If you need help or a new code, please call your [place] clinic or [phone].        Care EveryWhere ID     This is your Care EveryWhere ID. This could be used by other organizations to access your [place] medical records  [id number]        Your Vitals Were     Pulse Temperature Height Pulse Oximetry BMI (Body Mass Index)       60 98.4  F (36.9  C) (Tympanic) 5' 10\" (1.778 m) 98% 26.77 kg/m2        Blood Pressure from Last 3 Encounters:   17 128/74   17 129/76   17 140/78    Weight from Last 3 Encounters:   17 186 lb 9.6 oz (84.6 kg)   17 183 lb (83 kg)   17 182 lb 12.8 oz (82.9 kg)              Today, you had the following     No orders found for display         Today's Medication Changes          These changes are accurate as of: 17 10:06 AM.  If you have any questions, ask your nurse or doctor.               These medicines have changed or have updated prescriptions.        Dose/Directions    * amphetamine-dextroamphetamine 15 MG per tablet   Commonly known as:  ADDERALL   This may have changed:  Another medication with the same name was added. Make sure you understand how and when to take each.   Used " for:  ADHD (attention deficit hyperactivity disorder), inattentive type   Changed by:  Mick Richter MD        Dose:  15 mg   Take 1 tablet (15 mg) by mouth 2 times daily   Quantity:  60 tablet   Refills:  0       * amphetamine-dextroamphetamine 15 MG per tablet   Commonly known as:  ADDERALL   This may have changed:  You were already taking a medication with the same name, and this prescription was added. Make sure you understand how and when to take each.   Used for:  ADHD (attention deficit hyperactivity disorder), inattentive type   Changed by:  Mick Richter MD        Dose:  15 mg   Take 1 tablet (15 mg) by mouth 2 times daily   Quantity:  60 tablet   Refills:  0       * amphetamine-dextroamphetamine 15 MG per tablet   Commonly known as:  ADDERALL   This may have changed:  You were already taking a medication with the same name, and this prescription was added. Make sure you understand how and when to take each.   Used for:  ADHD (attention deficit hyperactivity disorder), inattentive type   Changed by:  Mick Richter MD        Dose:  15 mg   Start taking on:  5/19/2017   Take 1 tablet (15 mg) by mouth 2 times daily   Quantity:  60 tablet   Refills:  0       * amphetamine-dextroamphetamine 15 MG per tablet   Commonly known as:  ADDERALL   This may have changed:  You were already taking a medication with the same name, and this prescription was added. Make sure you understand how and when to take each.   Used for:  ADHD (attention deficit hyperactivity disorder), inattentive type   Changed by:  Mick Richter MD        Dose:  15 mg   Start taking on:  6/19/2017   Take 1 tablet (15 mg) by mouth 2 times daily   Quantity:  60 tablet   Refills:  0       * Notice:  This list has 4 medication(s) that are the same as other medications prescribed for you. Read the directions carefully, and ask your doctor or other care provider to review them with you.         Where to get your medicines       Some of these will need a paper prescription and others can be bought over the counter.  Ask your nurse if you have questions.     Bring a paper prescription for each of these medications     amphetamine-dextroamphetamine 15 MG per tablet    amphetamine-dextroamphetamine 15 MG per tablet    amphetamine-dextroamphetamine 15 MG per tablet                Primary Care Provider Office Phone # Fax #    Mick Richter [phone]       [facility] [address] [address]        Thank you!     Thank you for choosing [facility]  for your care. Our goal is always to provide you with excellent care. Hearing back from our patients is one way we can continue to improve our services. Please take a few minutes to complete the written survey that you may receive in the mail after your visit with us. Thank you!             Your Updated Medication List - Protect others around you: Learn how to safely use, store and throw away your medicines at www.disposemymeds.org.          This list is accurate as of: 4/18/17 10:06 AM.  Always use your most recent med list.                   Brand Name Dispense Instructions for use    * amphetamine-dextroamphetamine 15 MG per tablet    ADDERALL    60 tablet    Take 1 tablet (15 mg) by mouth 2 times daily       * amphetamine-dextroamphetamine 15 MG per tablet    ADDERALL    60 tablet    Take 1 tablet (15 mg) by mouth 2 times daily       * amphetamine-dextroamphetamine 15 MG per tablet   Start taking on:  5/19/2017    ADDERALL    60 tablet    Take 1 tablet (15 mg) by mouth 2 times daily       * amphetamine-dextroamphetamine 15 MG per tablet   Start taking on:  6/19/2017    ADDERALL    60 tablet    Take 1 tablet (15 mg) by mouth 2 times daily       nicotine 14 MG/24HR 24 hr patch    NICODERM CQ    21 patch    Place 1 patch onto the skin every 24 hours       nicotine polacrilex 2 MG gum    NICORETTE    100 tablet    Place 1 each (2 mg)  inside cheek as needed for smoking cessation       * Notice:  This list has 4 medication(s) that are the same as other medications prescribed for you. Read the directions carefully, and ask your doctor or other care provider to review them with you.

## 2017-04-19 ASSESSMENT — PATIENT HEALTH QUESTIONNAIRE - PHQ9: SUM OF ALL RESPONSES TO PHQ QUESTIONS 1-9: 2

## 2017-07-14 ENCOUNTER — OFFICE VISIT (OUTPATIENT)
Dept: FAMILY MEDICINE | Facility: CLINIC | Age: 32
End: 2017-07-14
Payer: COMMERCIAL

## 2017-07-14 VITALS
TEMPERATURE: 97 F | HEART RATE: 96 BPM | BODY MASS INDEX: 25.97 KG/M2 | WEIGHT: 181 LBS | DIASTOLIC BLOOD PRESSURE: 73 MMHG | SYSTOLIC BLOOD PRESSURE: 133 MMHG

## 2017-07-14 DIAGNOSIS — F90.0 ADHD (ATTENTION DEFICIT HYPERACTIVITY DISORDER), INATTENTIVE TYPE: Primary | ICD-10-CM

## 2017-07-14 PROCEDURE — 99213 OFFICE O/P EST LOW 20 MIN: CPT | Performed by: FAMILY MEDICINE

## 2017-07-14 RX ORDER — DEXTROAMPHETAMINE SACCHARATE, AMPHETAMINE ASPARTATE, DEXTROAMPHETAMINE SULFATE AND AMPHETAMINE SULFATE 3.75; 3.75; 3.75; 3.75 MG/1; MG/1; MG/1; MG/1
15 TABLET ORAL 2 TIMES DAILY
Qty: 60 TABLET | Refills: 0 | Status: SHIPPED | OUTPATIENT
Start: 2017-07-14 | End: 2017-08-13

## 2017-07-14 RX ORDER — DEXTROAMPHETAMINE SACCHARATE, AMPHETAMINE ASPARTATE, DEXTROAMPHETAMINE SULFATE AND AMPHETAMINE SULFATE 3.75; 3.75; 3.75; 3.75 MG/1; MG/1; MG/1; MG/1
15 TABLET ORAL 2 TIMES DAILY
Qty: 60 TABLET | Refills: 0 | Status: SHIPPED | OUTPATIENT
Start: 2017-08-14 | End: 2017-09-13

## 2017-07-14 RX ORDER — DEXTROAMPHETAMINE SACCHARATE, AMPHETAMINE ASPARTATE, DEXTROAMPHETAMINE SULFATE AND AMPHETAMINE SULFATE 3.75; 3.75; 3.75; 3.75 MG/1; MG/1; MG/1; MG/1
15 TABLET ORAL 2 TIMES DAILY
Qty: 60 TABLET | Refills: 0 | Status: SHIPPED | OUTPATIENT
Start: 2017-09-14 | End: 2017-10-14

## 2017-07-14 NOTE — MR AVS SNAPSHOT
"              After Visit Summary   2017    Jona Zafar    MRN: [id number]           Patient Information     Date Of Birth          1985        Visit Information        Provider Department      2017 8:00 AM Mick Richter MD [facility]        Today's Diagnoses     ADHD (attention deficit hyperactivity disorder), inattentive type    -  1      Care Instructions    Next clinic visit in 3 months          Follow-ups after your visit        Who to contact     If you have questions or need follow up information about today's clinic visit or your schedule please contact [facility] directly at [phone].  Normal or non-critical lab and imaging results will be communicated to you by [employer], letter or phone within 4 business days after the clinic has received the results. If you do not hear from us within 7 days, please contact the clinic through [employer] or phone. If you have a critical or abnormal lab result, we will notify you by phone as soon as possible.  Submit refill requests through [employer] or call your pharmacy and they will forward the refill request to us. Please allow 3 business days for your refill to be completed.          Additional Information About Your Visit        MyChart Information     [employer] lets you send messages to your doctor, view your test results, renew your prescriptions, schedule appointments and more. To sign up, go to [email or web address] . Click on \"Log in\" on the left side of the screen, which will take you to the Welcome page. Then click on \"Sign up Now\" on the right side of the page.     You will be asked to enter the access code listed below, as well as some personal information. Please follow the directions to create your username and password.     Your access code is: [email or web address]  Expires: 10/12/2017  8:36 AM     Your access code will  in 90 days. If you need help or a new code, please call your [facility] or " [phone].        Care EveryWhere ID     This is your Care EveryWhere ID. This could be used by other organizations to access your [place] medical records  [id number]        Your Vitals Were     Pulse Temperature BMI (Body Mass Index)             96 97  F (36.1  C) (Tympanic) 25.97 kg/m2          Blood Pressure from Last 3 Encounters:   07/14/17 133/73   04/18/17 128/74   03/17/17 129/76    Weight from Last 3 Encounters:   07/14/17 181 lb (82.1 kg)   04/18/17 186 lb 9.6 oz (84.6 kg)   03/17/17 183 lb (83 kg)              Today, you had the following     No orders found for display         Today's Medication Changes          These changes are accurate as of: 7/14/17  8:36 AM.  If you have any questions, ask your nurse or doctor.               These medicines have changed or have updated prescriptions.        Dose/Directions    * amphetamine-dextroamphetamine 15 MG per tablet   Commonly known as:  ADDERALL   This may have changed:  Another medication with the same name was added. Make sure you understand how and when to take each.   Used for:  ADHD (attention deficit hyperactivity disorder), inattentive type   Changed by:  Mick Richter MD        Dose:  15 mg   Take 1 tablet (15 mg) by mouth 2 times daily   Quantity:  60 tablet   Refills:  0       * amphetamine-dextroamphetamine 15 MG per tablet   Commonly known as:  ADDERALL   This may have changed:  You were already taking a medication with the same name, and this prescription was added. Make sure you understand how and when to take each.   Used for:  ADHD (attention deficit hyperactivity disorder), inattentive type   Changed by:  Mick Richter MD        Dose:  15 mg   Take 1 tablet (15 mg) by mouth 2 times daily   Quantity:  60 tablet   Refills:  0       * amphetamine-dextroamphetamine 15 MG per tablet   Commonly known as:  ADDERALL   This may have changed:  You were already taking a medication with the same name, and this prescription was  added. Make sure you understand how and when to take each.   Used for:  ADHD (attention deficit hyperactivity disorder), inattentive type   Changed by:  Mick Richter MD        Dose:  15 mg   Start taking on:  8/14/2017   Take 1 tablet (15 mg) by mouth 2 times daily   Quantity:  60 tablet   Refills:  0       * amphetamine-dextroamphetamine 15 MG per tablet   Commonly known as:  ADDERALL   This may have changed:  You were already taking a medication with the same name, and this prescription was added. Make sure you understand how and when to take each.   Used for:  ADHD (attention deficit hyperactivity disorder), inattentive type   Changed by:  Mick Richter MD        Dose:  15 mg   Start taking on:  9/14/2017   Take 1 tablet (15 mg) by mouth 2 times daily   Quantity:  60 tablet   Refills:  0       * Notice:  This list has 4 medication(s) that are the same as other medications prescribed for you. Read the directions carefully, and ask your doctor or other care provider to review them with you.         Where to get your medicines      Some of these will need a paper prescription and others can be bought over the counter.  Ask your nurse if you have questions.     Bring a paper prescription for each of these medications     amphetamine-dextroamphetamine 15 MG per tablet    amphetamine-dextroamphetamine 15 MG per tablet    amphetamine-dextroamphetamine 15 MG per tablet                Primary Care Provider Office Phone # Fax #    Mick Richter [phone]       [facility] [address] [place] [address]        Equal Access to Services     KIKI HIGGINS AH: Hadii stephanie christenseno Sosonya, waaxda luqadaha, qaybta kaalmada adeegyada, afsaneh booth. So [facility] [phone].    ATENCIÓN: Si habla español, tiene a weathers disposición servicios gratuitos de asistencia lingüística. Llame al [phone].    We comply with applicable federal civil rights laws  and [place] laws. We do not discriminate on the basis of race, color, national origin, age, disability sex, sexual orientation or gender identity.            Thank you!     Thank you for choosing [facility]  for your care. Our goal is always to provide you with excellent care. Hearing back from our patients is one way we can continue to improve our services. Please take a few minutes to complete the written survey that you may receive in the mail after your visit with us. Thank you!             Your Updated Medication List - Protect others around you: Learn how to safely use, store and throw away your medicines at www.disposemymeds.org.          This list is accurate as of: 7/14/17  8:36 AM.  Always use your most recent med list.                   Brand Name Dispense Instructions for use Diagnosis    * amphetamine-dextroamphetamine 15 MG per tablet    ADDERALL    60 tablet    Take 1 tablet (15 mg) by mouth 2 times daily    ADHD (attention deficit hyperactivity disorder), inattentive type       * amphetamine-dextroamphetamine 15 MG per tablet    ADDERALL    60 tablet    Take 1 tablet (15 mg) by mouth 2 times daily    ADHD (attention deficit hyperactivity disorder), inattentive type       * amphetamine-dextroamphetamine 15 MG per tablet   Start taking on:  8/14/2017    ADDERALL    60 tablet    Take 1 tablet (15 mg) by mouth 2 times daily    ADHD (attention deficit hyperactivity disorder), inattentive type       * amphetamine-dextroamphetamine 15 MG per tablet   Start taking on:  9/14/2017    ADDERALL    60 tablet    Take 1 tablet (15 mg) by mouth 2 times daily    ADHD (attention deficit hyperactivity disorder), inattentive type       nicotine 14 MG/24HR 24 hr patch    NICODERM CQ    21 patch    Place 1 patch onto the skin every 24 hours    Encounter for smoking cessation counseling       nicotine polacrilex 2 MG gum    NICORETTE    100 tablet    Place 1 each (2 mg) inside cheek as needed for smoking  cessation    Encounter for smoking cessation counseling       * Notice:  This list has 4 medication(s) that are the same as other medications prescribed for you. Read the directions carefully, and ask your doctor or other care provider to review them with you.

## 2017-07-14 NOTE — PROGRESS NOTES
SUBJECTIVE:                                                    Jona Zafar is a 32 year old male who presents to clinic today for the following health issues:      Medication Followup of Aderall    Taking Medication as prescribed: yes    Side Effects:  None    Medication Helping Symptoms:  yes     Has been stable on 15mg BID for over 4 months now.  Did go through ADHD eval 2/14/17 with Dr. Covington and diagnosed with ADHD and medication recommended.   Symptoms of ADHD around age 10 and diagnosed but only on medications for few weeks and dad didn't want him on medication.     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    Problem list and histories reviewed & adjusted, as indicated.  Additional history: as documented    Reviewed and updated as needed this visit by clinical staff       Reviewed and updated as needed this visit by Provider         ROS:  C: NEGATIVE for fever, chills, change in weight  R: NEGATIVE for significant cough or SOB  CV: NEGATIVE for chest pain, palpitations or peripheral edema  NEURO: NEGATIVE for weakness, dizziness or paresthesias, no tremors or tics    OBJECTIVE:     /73 (BP Location: Left arm, Patient Position: Chair, Cuff Size: Adult Large)  Pulse 96  Temp 97  F (36.1  C) (Tympanic)  Wt 181 lb (82.1 kg)  BMI 25.97 kg/m2  Body mass index is 25.97 kg/(m^2).  GENERAL: healthy, alert and no distress  CV: regular rate and rhythm, normal S1 S2, no S3 or S4, no murmur, click or rub, no peripheral edema and peripheral pulses strong  PSYCH: mentation appears normal, affect normal/bright    Diagnostic Test Results:  none     ASSESSMENT/PLAN:       Jona was seen today for a.d.h.d.    Diagnoses and all orders for this visit:    ADHD (attention deficit hyperactivity disorder), inattentive type: well controlled, refill  -     amphetamine-dextroamphetamine (ADDERALL) 15 MG per tablet; Take 1 tablet (15 mg) by mouth 2 times daily  -     amphetamine-dextroamphetamine  (ADDERALL) 15 MG per tablet; Take 1 tablet (15 mg) by mouth 2 times daily  -     amphetamine-dextroamphetamine (ADDERALL) 15 MG per tablet; Take 1 tablet (15 mg) by mouth 2 times daily        Next clinic visit in 3 months    Mick Richter MD  [facility]

## 2017-07-14 NOTE — NURSING NOTE
"Chief Complaint   Patient presents with     A.D.H.D       Initial /73 (BP Location: Left arm, Patient Position: Chair, Cuff Size: Adult Large)  Pulse 96  Temp 97  F (36.1  C) (Tympanic)  Wt 181 lb (82.1 kg)  BMI 25.97 kg/m2 Estimated body mass index is 25.97 kg/(m^2) as calculated from the following:    Height as of 4/18/17: 5' 10\" (1.778 m).    Weight as of this encounter: 181 lb (82.1 kg).  Medication Reconciliation: complete    "

## 2017-10-13 ENCOUNTER — OFFICE VISIT (OUTPATIENT)
Dept: FAMILY MEDICINE | Facility: CLINIC | Age: 32
End: 2017-10-13
Payer: COMMERCIAL

## 2017-10-13 VITALS
HEART RATE: 65 BPM | TEMPERATURE: 97.1 F | SYSTOLIC BLOOD PRESSURE: 124 MMHG | BODY MASS INDEX: 24.28 KG/M2 | WEIGHT: 169.6 LBS | DIASTOLIC BLOOD PRESSURE: 65 MMHG | HEIGHT: 70 IN | OXYGEN SATURATION: 99 %

## 2017-10-13 DIAGNOSIS — F90.0 ADHD (ATTENTION DEFICIT HYPERACTIVITY DISORDER), INATTENTIVE TYPE: Primary | ICD-10-CM

## 2017-10-13 DIAGNOSIS — Z23 NEED FOR PROPHYLACTIC VACCINATION AND INOCULATION AGAINST INFLUENZA: ICD-10-CM

## 2017-10-13 PROCEDURE — 90686 IIV4 VACC NO PRSV 0.5 ML IM: CPT | Performed by: FAMILY MEDICINE

## 2017-10-13 PROCEDURE — 99213 OFFICE O/P EST LOW 20 MIN: CPT | Mod: 25 | Performed by: FAMILY MEDICINE

## 2017-10-13 PROCEDURE — 90471 IMMUNIZATION ADMIN: CPT | Performed by: FAMILY MEDICINE

## 2017-10-13 RX ORDER — DEXTROAMPHETAMINE SACCHARATE, AMPHETAMINE ASPARTATE, DEXTROAMPHETAMINE SULFATE AND AMPHETAMINE SULFATE 5; 5; 5; 5 MG/1; MG/1; MG/1; MG/1
20 TABLET ORAL 2 TIMES DAILY
Qty: 60 TABLET | Refills: 0 | Status: SHIPPED | OUTPATIENT
Start: 2017-12-14 | End: 2018-01-13

## 2017-10-13 RX ORDER — DEXTROAMPHETAMINE SACCHARATE, AMPHETAMINE ASPARTATE, DEXTROAMPHETAMINE SULFATE AND AMPHETAMINE SULFATE 5; 5; 5; 5 MG/1; MG/1; MG/1; MG/1
20 TABLET ORAL 2 TIMES DAILY
Qty: 60 TABLET | Refills: 0 | Status: SHIPPED | OUTPATIENT
Start: 2017-10-13 | End: 2017-11-12

## 2017-10-13 RX ORDER — DEXTROAMPHETAMINE SACCHARATE, AMPHETAMINE ASPARTATE, DEXTROAMPHETAMINE SULFATE AND AMPHETAMINE SULFATE 5; 5; 5; 5 MG/1; MG/1; MG/1; MG/1
20 TABLET ORAL 2 TIMES DAILY
Qty: 60 TABLET | Refills: 0 | Status: SHIPPED | OUTPATIENT
Start: 2017-11-13 | End: 2017-12-13

## 2017-10-13 NOTE — PROGRESS NOTES
"  SUBJECTIVE:   Jona Zafar is a 32 year old male who presents to clinic today for the following health issues:      Medication Followup of Adderall    Taking Medication as prescribed: yes    Side Effects:  None    Medication Helping Symptoms:  yes     Has been stable on 15mg BID for over 4 months now.  Did go through ADHD eval 2/14/17 with Dr. Covington and diagnosed with ADHD and medication recommended.   Symptoms of ADHD around age 10 and diagnosed but only on medications for few weeks and dad didn't want him on medication.     Over the last month feeling like having more trouble with attention and focus more, like losing keys and gas can and getting behind on paperwork.    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    Problem list and histories reviewed & adjusted, as indicated.  Additional history: as documented    BP Readings from Last 3 Encounters:   10/13/17 124/65   07/14/17 133/73   04/18/17 128/74    Wt Readings from Last 3 Encounters:   10/13/17 169 lb 9.6 oz (76.9 kg)   07/14/17 181 lb (82.1 kg)   04/18/17 186 lb 9.6 oz (84.6 kg)                      Reviewed and updated as needed this visit by clinical staffTobacco  Allergies  Med Hx  Surg Hx  Fam Hx  Soc Hx      Reviewed and updated as needed this visit by Provider         ROS:  C: NEGATIVE for fever, chills, change in weight  E/M: NEGATIVE for ear, mouth and throat problems  R: NEGATIVE for significant cough or SOB  CV: NEGATIVE for chest pain, palpitations or peripheral edema  NEURO: NEGATIVE for weakness, dizziness or paresthesias, no tremor or tics  PSYCHIATRIC: NEGATIVE for changes in mood or affect    OBJECTIVE:     /65  Pulse 65  Temp 97.1  F (36.2  C) (Tympanic)  Ht 5' 10\" (1.778 m)  Wt 169 lb 9.6 oz (76.9 kg)  SpO2 99%  BMI 24.34 kg/m2  Body mass index is 24.34 kg/(m^2).  GENERAL: healthy, alert and no distress  CV: regular rate and rhythm, normal S1 S2, no S3 or S4, no murmur, click or rub, no peripheral " edema and peripheral pulses strong  NEURO: normal mentation and speech, no tremor  PSYCH: mentation appears normal, affect normal/bright    Diagnostic Test Results:  none     ASSESSMENT/PLAN:       Jona was seen today for refill request and flu shot.    Diagnoses and all orders for this visit:    ADHD (attention deficit hyperactivity disorder), inattentive type: not as well controlled, plan to increase to 20mg BID for the next 3 months  -call sooner if any issues with increasing the adderall.  -     amphetamine-dextroamphetamine (ADDERALL) 20 MG per tablet; Take 1 tablet (20 mg) by mouth 2 times daily  -     amphetamine-dextroamphetamine (ADDERALL) 20 MG per tablet; Take 1 tablet (20 mg) by mouth 2 times daily  -     amphetamine-dextroamphetamine (ADDERALL) 20 MG per tablet; Take 1 tablet (20 mg) by mouth 2 times daily    Need for prophylactic vaccination and inoculation against influenza  -     FLU VAC, SPLIT VIRUS IM > 3 YO (QUADRIVALENT) [72507]  -     Vaccine Administration, Initial [60996]        Patient Instructions   Plan to increase the adderall 20mg twice daily    Next clinic visit in 3 months, sooner if any issues or concerns with the increase dose        Mick Richter MD  [facility]  Injectable Influenza Immunization Documentation    1.  Is the person to be vaccinated sick today?   No    2. Does the person to be vaccinated have an allergy to a component   of the vaccine?   No    3. Has the person to be vaccinated ever had a serious reaction   to influenza vaccine in the past?   No    4. Has the person to be vaccinated ever had Guillain-Barré syndrome?   No    Form completed by Deborah Bill CMA

## 2017-10-13 NOTE — NURSING NOTE
"Chief Complaint   Patient presents with     Refill Request     patient is wanting to discuss refill and dosage on Adderall       Initial /65  Pulse 65  Temp 97.1  F (36.2  C) (Tympanic)  Ht 5' 10\" (1.778 m)  Wt 169 lb 9.6 oz (76.9 kg)  SpO2 99%  BMI 24.34 kg/m2 Estimated body mass index is 24.34 kg/(m^2) as calculated from the following:    Height as of this encounter: 5' 10\" (1.778 m).    Weight as of this encounter: 169 lb 9.6 oz (76.9 kg).  Medication Reconciliation: complete  "

## 2017-10-13 NOTE — MR AVS SNAPSHOT
After Visit Summary   10/13/2017    Jona Zafar    MRN: [id number]           Patient Information     Date Of Birth          1985        Visit Information        Provider Department      10/13/2017 9:20 AM Mick Richter MD [facility]        Today's Diagnoses     ADHD (attention deficit hyperactivity disorder), inattentive type    -  1    Need for prophylactic vaccination and inoculation against influenza          Care Instructions    Plan to increase the adderall 20mg twice daily    Next clinic visit in 3 months, sooner if any issues or concerns with the increase dose      Thank you for choosing [facility].  You may be receiving a survey in the mail from [employer] regarding your visit today.  Please take a few minutes to complete and return the survey to let us know how we are doing.      If you have questions or concerns, please contact us via [email or web address] or you can contact your care team at [phone].    Our Clinic hours are:  Monday 6:40 am  to 7:00 pm  Tuesday -Friday 6:40 am to 5:00 pm    The [place] outpatient lab hours are:  Monday - Friday 6:10 am to 4:45 pm  Saturdays 7:00 am to 11:00 am  Appointments are required, call [phone]    If you have clinical questions after hours or would like to schedule an appointment,  call the clinic at [phone].          Follow-ups after your visit        Who to contact     If you have questions or need follow up information about today's clinic visit or your schedule please contact [facility] directly at [phone].  Normal or non-critical lab and imaging results will be communicated to you by MyChart, letter or phone within 4 business days after the clinic has received the results. If you do not hear from us within 7 days, please contact the clinic through Zazubat or phone. If you have a critical or abnormal lab result, we will notify you by phone as soon as possible.  Submit refill requests through  "Zenaidat or call your pharmacy and they will forward the refill request to us. Please allow 3 business days for your refill to be completed.          Additional Information About Your Visit        MyChart Information     [employer] lets you send messages to your doctor, view your test results, renew your prescriptions, schedule appointments and more. To sign up, go to [email or web address] . Click on \"Log in\" on the left side of the screen, which will take you to the Welcome page. Then click on \"Sign up Now\" on the right side of the page.     You will be asked to enter the access code listed below, as well as some personal information. Please follow the directions to create your username and password.     Your access code is: [email or web address]  Expires: 2018  9:47 AM     Your access code will  in 90 days. If you need help or a new code, please call your [facility] clinic or [phone].        Care EveryWhere ID     This is your Care EveryWhere ID. This could be used by other organizations to access your [facility] medical records  [id number]        Your Vitals Were     Pulse Temperature Height Pulse Oximetry BMI (Body Mass Index)       65 97.1  F (36.2  C) (Tympanic) 5' 10\" (1.778 m) 99% 24.34 kg/m2        Blood Pressure from Last 3 Encounters:   10/13/17 124/65   17 133/73   17 128/74    Weight from Last 3 Encounters:   10/13/17 169 lb 9.6 oz (76.9 kg)   17 181 lb (82.1 kg)   17 186 lb 9.6 oz (84.6 kg)              We Performed the Following     FLU VAC, SPLIT VIRUS IM > 3 YO (QUADRIVALENT) [91923]     Vaccine Administration, Initial [83909]          Today's Medication Changes          These changes are accurate as of: 10/13/17  9:47 AM.  If you have any questions, ask your nurse or doctor.               These medicines have changed or have updated prescriptions.        Dose/Directions    * amphetamine-dextroamphetamine 15 MG per tablet   Commonly known as:  ADDERALL   This may have " changed:  Another medication with the same name was added. Make sure you understand how and when to take each.   Used for:  ADHD (attention deficit hyperactivity disorder), inattentive type   Changed by:  Mick Richter MD        Dose:  15 mg   Take 1 tablet (15 mg) by mouth 2 times daily   Quantity:  60 tablet   Refills:  0       * amphetamine-dextroamphetamine 20 MG per tablet   Commonly known as:  ADDERALL   This may have changed:  You were already taking a medication with the same name, and this prescription was added. Make sure you understand how and when to take each.   Used for:  ADHD (attention deficit hyperactivity disorder), inattentive type   Changed by:  Mick Richter MD        Dose:  20 mg   Take 1 tablet (20 mg) by mouth 2 times daily   Quantity:  60 tablet   Refills:  0       * amphetamine-dextroamphetamine 20 MG per tablet   Commonly known as:  ADDERALL   This may have changed:  You were already taking a medication with the same name, and this prescription was added. Make sure you understand how and when to take each.   Used for:  ADHD (attention deficit hyperactivity disorder), inattentive type   Changed by:  Mick Richter MD        Dose:  20 mg   Start taking on:  11/13/2017   Take 1 tablet (20 mg) by mouth 2 times daily   Quantity:  60 tablet   Refills:  0       * amphetamine-dextroamphetamine 20 MG per tablet   Commonly known as:  ADDERALL   This may have changed:  You were already taking a medication with the same name, and this prescription was added. Make sure you understand how and when to take each.   Used for:  ADHD (attention deficit hyperactivity disorder), inattentive type   Changed by:  Mick Richter MD        Dose:  20 mg   Start taking on:  12/14/2017   Take 1 tablet (20 mg) by mouth 2 times daily   Quantity:  60 tablet   Refills:  0       * Notice:  This list has 4 medication(s) that are the same as other medications prescribed for you. Read the directions  carefully, and ask your doctor or other care provider to review them with you.         Where to get your medicines      Some of these will need a paper prescription and others can be bought over the counter.  Ask your nurse if you have questions.     Bring a paper prescription for each of these medications     amphetamine-dextroamphetamine 20 MG per tablet    amphetamine-dextroamphetamine 20 MG per tablet    amphetamine-dextroamphetamine 20 MG per tablet                Primary Care Provider Office Phone # Fax #    Mick Richter [phone] [address] [facility] [address]        Equal Access to Services     [facility] GISELA : Hadii aad ku hadasho Soomaali, waaxda luqadaha, qaybta kaalmada adeericka, afsaneh borden . So [facility] [phone].    ATENCIÓN: Si habla español, tiene a weathers disposición servicios gratuitos de asistencia lingüística. [facility] [phone].    We comply with applicable federal civil rights laws and [place] laws. We do not discriminate on the basis of race, color, national origin, age, disability, sex, sexual orientation, or gender identity.            Thank you!     Thank you for choosing [facility]  for your care. Our goal is always to provide you with excellent care. Hearing back from our patients is one way we can continue to improve our services. Please take a few minutes to complete the written survey that you may receive in the mail after your visit with us. Thank you!             Your Updated Medication List - Protect others around you: Learn how to safely use, store and throw away your medicines at www.disposemymeds.org.          This list is accurate as of: 10/13/17  9:47 AM.  Always use your most recent med list.                   Brand Name Dispense Instructions for use Diagnosis    * amphetamine-dextroamphetamine 15 MG per tablet    ADDERALL    60 tablet    Take 1 tablet (15 mg) by mouth 2 times daily    ADHD  (attention deficit hyperactivity disorder), inattentive type       * amphetamine-dextroamphetamine 20 MG per tablet    ADDERALL    60 tablet    Take 1 tablet (20 mg) by mouth 2 times daily    ADHD (attention deficit hyperactivity disorder), inattentive type       * amphetamine-dextroamphetamine 20 MG per tablet   Start taking on:  11/13/2017    ADDERALL    60 tablet    Take 1 tablet (20 mg) by mouth 2 times daily    ADHD (attention deficit hyperactivity disorder), inattentive type       * amphetamine-dextroamphetamine 20 MG per tablet   Start taking on:  12/14/2017    ADDERALL    60 tablet    Take 1 tablet (20 mg) by mouth 2 times daily    ADHD (attention deficit hyperactivity disorder), inattentive type       * Notice:  This list has 4 medication(s) that are the same as other medications prescribed for you. Read the directions carefully, and ask your doctor or other care provider to review them with you.

## 2017-10-13 NOTE — PATIENT INSTRUCTIONS
Plan to increase the adderall 20mg twice daily    Next clinic visit in 3 months, sooner if any issues or concerns with the increase dose      Thank you for choosing [facility].  You may be receiving a survey in the mail from Rachel Figueroa regarding your visit today.  Please take a few minutes to complete and return the survey to let us know how we are doing.      If you have questions or concerns, please contact us via CSDN or you can contact your care team at [phone].    Our Clinic hours are:  Monday 6:40 am  to 7:00 pm  Tuesday -Friday 6:40 am to 5:00 pm    The [place] outpatient lab hours are:  Monday - Friday 6:10 am to 4:45 pm  Saturdays 7:00 am to 11:00 am  Appointments are required, call [phone]    If you have clinical questions after hours or would like to schedule an appointment,  call the clinic at [phone].

## 2018-01-12 ENCOUNTER — OFFICE VISIT (OUTPATIENT)
Dept: FAMILY MEDICINE | Facility: CLINIC | Age: 33
End: 2018-01-12
Payer: COMMERCIAL

## 2018-01-12 VITALS
RESPIRATION RATE: 16 BRPM | WEIGHT: 178 LBS | OXYGEN SATURATION: 99 % | SYSTOLIC BLOOD PRESSURE: 127 MMHG | BODY MASS INDEX: 25.48 KG/M2 | HEART RATE: 64 BPM | DIASTOLIC BLOOD PRESSURE: 72 MMHG | TEMPERATURE: 97 F | HEIGHT: 70 IN

## 2018-01-12 DIAGNOSIS — F32.5 MAJOR DEPRESSIVE DISORDER WITH SINGLE EPISODE, IN FULL REMISSION (H): ICD-10-CM

## 2018-01-12 DIAGNOSIS — F90.0 ADHD (ATTENTION DEFICIT HYPERACTIVITY DISORDER), INATTENTIVE TYPE: Primary | ICD-10-CM

## 2018-01-12 PROCEDURE — 99213 OFFICE O/P EST LOW 20 MIN: CPT | Performed by: FAMILY MEDICINE

## 2018-01-12 RX ORDER — DEXTROAMPHETAMINE SACCHARATE, AMPHETAMINE ASPARTATE, DEXTROAMPHETAMINE SULFATE AND AMPHETAMINE SULFATE 5; 5; 5; 5 MG/1; MG/1; MG/1; MG/1
20 TABLET ORAL 2 TIMES DAILY
Qty: 60 TABLET | Refills: 0 | Status: CANCELLED | OUTPATIENT
Start: 2018-01-12

## 2018-01-12 RX ORDER — DEXTROAMPHETAMINE SACCHARATE, AMPHETAMINE ASPARTATE, DEXTROAMPHETAMINE SULFATE AND AMPHETAMINE SULFATE 5; 5; 5; 5 MG/1; MG/1; MG/1; MG/1
20 TABLET ORAL 2 TIMES DAILY
Qty: 60 TABLET | Refills: 0 | Status: SHIPPED | OUTPATIENT
Start: 2018-03-15 | End: 2018-04-14

## 2018-01-12 RX ORDER — DEXTROAMPHETAMINE SACCHARATE, AMPHETAMINE ASPARTATE, DEXTROAMPHETAMINE SULFATE AND AMPHETAMINE SULFATE 5; 5; 5; 5 MG/1; MG/1; MG/1; MG/1
20 TABLET ORAL 2 TIMES DAILY
Qty: 60 TABLET | Refills: 0 | Status: SHIPPED | OUTPATIENT
Start: 2018-02-12 | End: 2018-03-14

## 2018-01-12 RX ORDER — DEXTROAMPHETAMINE SACCHARATE, AMPHETAMINE ASPARTATE, DEXTROAMPHETAMINE SULFATE AND AMPHETAMINE SULFATE 5; 5; 5; 5 MG/1; MG/1; MG/1; MG/1
20 TABLET ORAL 2 TIMES DAILY
Qty: 60 TABLET | Refills: 0 | Status: SHIPPED | OUTPATIENT
Start: 2018-01-12 | End: 2018-02-11

## 2018-01-12 NOTE — MR AVS SNAPSHOT
After Visit Summary   1/12/2018    Jona Zafar    MRN: [id number]           Patient Information     Date Of Birth          1985        Visit Information        Provider Department      1/12/2018 10:20 AM Mick Richter MD [facility]        Today's Diagnoses     ADHD (attention deficit hyperactivity disorder), inattentive type    -  1    Major depressive disorder with single episode, in full remission (H)          Care Instructions    Next clinic visit for refills in 3 months, sooner if any concerns.      Thank you for choosing [facility].  You may be receiving a survey in the mail from [employer] regarding your visit today.  Please take a few minutes to complete and return the survey to let us know how we are doing.      If you have questions or concerns, please contact us via [employer] or you can contact your care team at [phone].    Our Clinic hours are:  Monday 6:40 am  to 7:00 pm  Tuesday -Friday 6:40 am to 5:00 pm    The [place] outpatient lab hours are:  Monday - Friday 6:10 am to 4:45 pm  Saturdays 7:00 am to 11:00 am  Appointments are required, call [phone]    If you have clinical questions after hours or would like to schedule an appointment,  call the clinic at [phone].          Follow-ups after your visit        Who to contact     If you have questions or need follow up information about today's clinic visit or your schedule please contact [facility] directly at [phone].  Normal or non-critical lab and imaging results will be communicated to you by Searchleshart, letter or phone within 4 business days after the clinic has received the results. If you do not hear from us within 7 days, please contact the clinic through [employer] or phone. If you have a critical or abnormal lab result, we will notify you by phone as soon as possible.  Submit refill requests through [employer] or call your pharmacy and they will forward the refill request  "to us. Please allow 3 business days for your refill to be completed.          Additional Information About Your Visit        Crowdzuhart Information     [employer] lets you send messages to your doctor, view your test results, renew your prescriptions, schedule appointments and more. To sign up, go to [email or web address] . Click on \"Log in\" on the left side of the screen, which will take you to the Welcome page. Then click on \"Sign up Now\" on the right side of the page.     You will be asked to enter the access code listed below, as well as some personal information. Please follow the directions to create your username and password.     Your access code is: [email or web address]  Expires: 2018 10:34 AM     Your access code will  in 90 days. If you need help or a new code, please call your [place] clinic or [phone].        Care EveryWhere ID     This is your [facility] EveryWhere ID. This could be used by other organizations to access your [place] medical records  [id number]        Your Vitals Were     Pulse Temperature Respirations Height Pulse Oximetry BMI (Body Mass Index)    64 97  F (36.1  C) (Tympanic) 16 5' 9.5\" (1.765 m) 99% 25.91 kg/m2       Blood Pressure from Last 3 Encounters:   18 127/72   10/13/17 124/65   17 133/73    Weight from Last 3 Encounters:   18 178 lb (80.7 kg)   10/13/17 169 lb 9.6 oz (76.9 kg)   17 181 lb (82.1 kg)              Today, you had the following     No orders found for display         Today's Medication Changes          These changes are accurate as of: 18 10:35 AM.  If you have any questions, ask your nurse or doctor.               These medicines have changed or have updated prescriptions.        Dose/Directions    * amphetamine-dextroamphetamine 20 MG per tablet   Commonly known as:  ADDERALL   This may have changed:  Another medication with the same name was added. Make sure you understand how and when to take each.   Used for:  ADHD (attention " deficit hyperactivity disorder), inattentive type   Changed by:  Mick Richter MD        Dose:  20 mg   Take 1 tablet (20 mg) by mouth 2 times daily   Quantity:  60 tablet   Refills:  0       * amphetamine-dextroamphetamine 20 MG per tablet   Commonly known as:  ADDERALL   This may have changed:  You were already taking a medication with the same name, and this prescription was added. Make sure you understand how and when to take each.   Used for:  ADHD (attention deficit hyperactivity disorder), inattentive type   Changed by:  Mick Richter MD        Dose:  20 mg   Take 1 tablet (20 mg) by mouth 2 times daily   Quantity:  60 tablet   Refills:  0       * amphetamine-dextroamphetamine 20 MG per tablet   Commonly known as:  ADDERALL   This may have changed:  You were already taking a medication with the same name, and this prescription was added. Make sure you understand how and when to take each.   Used for:  ADHD (attention deficit hyperactivity disorder), inattentive type   Changed by:  Mick Richter MD        Dose:  20 mg   Start taking on:  2/12/2018   Take 1 tablet (20 mg) by mouth 2 times daily   Quantity:  60 tablet   Refills:  0       * amphetamine-dextroamphetamine 20 MG per tablet   Commonly known as:  ADDERALL   This may have changed:  You were already taking a medication with the same name, and this prescription was added. Make sure you understand how and when to take each.   Used for:  ADHD (attention deficit hyperactivity disorder), inattentive type   Changed by:  Mick Richter MD        Dose:  20 mg   Start taking on:  3/15/2018   Take 1 tablet (20 mg) by mouth 2 times daily   Quantity:  60 tablet   Refills:  0       * Notice:  This list has 4 medication(s) that are the same as other medications prescribed for you. Read the directions carefully, and ask your doctor or other care provider to review them with you.         Where to get your medicines      Some of these will  need a paper prescription and others can be bought over the counter.  Ask your nurse if you have questions.     Bring a paper prescription for each of these medications     amphetamine-dextroamphetamine 20 MG per tablet    amphetamine-dextroamphetamine 20 MG per tablet    amphetamine-dextroamphetamine 20 MG per tablet                Primary Care Provider Office Phone # Fax #    Mick Richter [phone] [address] [facility] [address]        Equal Access to Services     KIKI HIGGINS : Hadii aad ku hadasho Soomaali, waaxda luqadaha, qaybta kaalmada adeegyada, waxay idiin hayaan adeeg darinaraanitha la'charan . So [facility] [phone].    ATENCIÓN: Si habla español, tiene a weathers disposición servicios gratuitos de asistencia lingüística. [place] [phone].    We comply with applicable federal civil rights laws and [place] laws. We do not discriminate on the basis of race, color, national origin, age, disability, sex, sexual orientation, or gender identity.            Thank you!     Thank you for choosing [facility]  for your care. Our goal is always to provide you with excellent care. Hearing back from our patients is one way we can continue to improve our services. Please take a few minutes to complete the written survey that you may receive in the mail after your visit with us. Thank you!             Your Updated Medication List - Protect others around you: Learn how to safely use, store and throw away your medicines at www.disposemymeds.org.          This list is accurate as of: 1/12/18 10:35 AM.  Always use your most recent med list.                   Brand Name Dispense Instructions for use Diagnosis    * amphetamine-dextroamphetamine 20 MG per tablet    ADDERALL    60 tablet    Take 1 tablet (20 mg) by mouth 2 times daily    ADHD (attention deficit hyperactivity disorder), inattentive type       * amphetamine-dextroamphetamine 20 MG per tablet    ADDERALL    60 tablet     Take 1 tablet (20 mg) by mouth 2 times daily    ADHD (attention deficit hyperactivity disorder), inattentive type       * amphetamine-dextroamphetamine 20 MG per tablet   Start taking on:  2/12/2018    ADDERALL    60 tablet    Take 1 tablet (20 mg) by mouth 2 times daily    ADHD (attention deficit hyperactivity disorder), inattentive type       * amphetamine-dextroamphetamine 20 MG per tablet   Start taking on:  3/15/2018    ADDERALL    60 tablet    Take 1 tablet (20 mg) by mouth 2 times daily    ADHD (attention deficit hyperactivity disorder), inattentive type       * Notice:  This list has 4 medication(s) that are the same as other medications prescribed for you. Read the directions carefully, and ask your doctor or other care provider to review them with you.

## 2018-01-12 NOTE — PATIENT INSTRUCTIONS
Next clinic visit for refills in 3 months, sooner if any concerns.      Thank you for choosing [facility].  You may be receiving a survey in the mail from Rachel Figueroa regarding your visit today.  Please take a few minutes to complete and return the survey to let us know how we are doing.      If you have questions or concerns, please contact us via [employer]Seal or you can contact your care team at [phone].    Our Clinic hours are:  Monday 6:40 am  to 7:00 pm  Tuesday -Friday 6:40 am to 5:00 pm    The [place] outpatient lab hours are:  Monday - Friday 6:10 am to 4:45 pm  Saturdays 7:00 am to 11:00 am  Appointments are required, call [phone]    If you have clinical questions after hours or would like to schedule an appointment,  call the clinic at [phone].

## 2018-01-12 NOTE — PROGRESS NOTES
"  SUBJECTIVE:   Jona Zafar is a 32 year old male who presents to clinic today for the following health issues:      Medication Followup of Adderall    Taking Medication as prescribed: yes    Side Effects:  None    Medication Helping Symptoms:  yes     Has been stable on 20mg BID for over 4 months now.  Did go through ADHD eval 2/14/17 with Dr. Covington and diagnosed with ADHD and medication recommended.   Symptoms of ADHD around age 10 and diagnosed but only on medications for few weeks and dad didn't want him on medication.     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 Able to do paperwork and keep organized.    Problem list and histories reviewed & adjusted, as indicated.  Additional history: as documented    BP Readings from Last 3 Encounters:   01/12/18 127/72   10/13/17 124/65   07/14/17 133/73    Wt Readings from Last 3 Encounters:   01/12/18 178 lb (80.7 kg)   10/13/17 169 lb 9.6 oz (76.9 kg)   07/14/17 181 lb (82.1 kg)               Reviewed and updated as needed this visit by clinical staffTobacco  Allergies       Reviewed and updated as needed this visit by Provider         ROS:  C: NEGATIVE for fever, chills, change in weight, no trouble sleeping  E/M: NEGATIVE for ear, mouth and throat problems  R: NEGATIVE for significant cough or SOB  CV: NEGATIVE for chest pain, palpitations or peripheral edema  NEURO: NEGATIVE for weakness, dizziness or paresthesias, no tremor, no tics  PSYCHIATRIC: NEGATIVE for changes in mood or affect    OBJECTIVE:     /72  Pulse 64  Temp 97  F (36.1  C) (Tympanic)  Resp 16  Ht 5' 9.5\" (1.765 m)  Wt 178 lb (80.7 kg)  SpO2 99%  BMI 25.91 kg/m2  Body mass index is 25.91 kg/(m^2).  GENERAL: healthy, alert and no distress  CV: regular rate and rhythm, normal S1 S2, no S3 or S4, no murmur, click or rub, no peripheral edema and peripheral pulses strong  SKIN: no suspicious lesions or rashes  NEURO: Normal strength and tone, mentation intact and speech normal, no " tremor  PSYCH: mentation appears normal, affect normal/bright    Diagnostic Test Results:  none     ASSESSMENT/PLAN:       Jona was seen today for refill request.    Diagnoses and all orders for this visit:    ADHD (attention deficit hyperactivity disorder), inattentive type: well controlled, refill for 3 months  -     amphetamine-dextroamphetamine (ADDERALL) 20 MG per tablet; Take 1 tablet (20 mg) by mouth 2 times daily  -     amphetamine-dextroamphetamine (ADDERALL) 20 MG per tablet; Take 1 tablet (20 mg) by mouth 2 times daily  -     amphetamine-dextroamphetamine (ADDERALL) 20 MG per tablet; Take 1 tablet (20 mg) by mouth 2 times daily    Major depressive disorder with single episode, in full remission (H): in remission        Patient Instructions   Next clinic visit for refills in 3 months, sooner if any concerns.    Mick Richter MD  [facility]

## 2018-01-12 NOTE — NURSING NOTE
"Chief Complaint   Patient presents with     Refill Request     adderall       Initial /72  Pulse 64  Temp 97  F (36.1  C) (Tympanic)  Resp 16  Ht 5' 9.5\" (1.765 m)  Wt 178 lb (80.7 kg)  SpO2 99%  BMI 25.91 kg/m2 Estimated body mass index is 25.91 kg/(m^2) as calculated from the following:    Height as of this encounter: 5' 9.5\" (1.765 m).    Weight as of this encounter: 178 lb (80.7 kg).  Medication Reconciliation: complete  "

## 2018-04-10 ENCOUNTER — OFFICE VISIT (OUTPATIENT)
Dept: FAMILY MEDICINE | Facility: CLINIC | Age: 33
End: 2018-04-10
Payer: COMMERCIAL

## 2018-04-10 VITALS
TEMPERATURE: 97.8 F | DIASTOLIC BLOOD PRESSURE: 77 MMHG | SYSTOLIC BLOOD PRESSURE: 132 MMHG | WEIGHT: 188.8 LBS | HEART RATE: 60 BPM | BODY MASS INDEX: 27.03 KG/M2 | OXYGEN SATURATION: 100 % | HEIGHT: 70 IN

## 2018-04-10 DIAGNOSIS — F90.0 ADHD (ATTENTION DEFICIT HYPERACTIVITY DISORDER), INATTENTIVE TYPE: ICD-10-CM

## 2018-04-10 DIAGNOSIS — F32.5 MAJOR DEPRESSIVE DISORDER WITH SINGLE EPISODE, IN FULL REMISSION (H): Primary | ICD-10-CM

## 2018-04-10 PROCEDURE — 99213 OFFICE O/P EST LOW 20 MIN: CPT | Performed by: FAMILY MEDICINE

## 2018-04-10 RX ORDER — DEXTROAMPHETAMINE SACCHARATE, AMPHETAMINE ASPARTATE, DEXTROAMPHETAMINE SULFATE AND AMPHETAMINE SULFATE 5; 5; 5; 5 MG/1; MG/1; MG/1; MG/1
20 TABLET ORAL 2 TIMES DAILY
Qty: 60 TABLET | Refills: 0 | Status: SHIPPED | OUTPATIENT
Start: 2018-05-11 | End: 2018-06-10

## 2018-04-10 RX ORDER — DEXTROAMPHETAMINE SACCHARATE, AMPHETAMINE ASPARTATE, DEXTROAMPHETAMINE SULFATE AND AMPHETAMINE SULFATE 5; 5; 5; 5 MG/1; MG/1; MG/1; MG/1
20 TABLET ORAL 2 TIMES DAILY
Qty: 60 TABLET | Refills: 0 | Status: SHIPPED | OUTPATIENT
Start: 2018-06-11 | End: 2018-07-11

## 2018-04-10 RX ORDER — DEXTROAMPHETAMINE SACCHARATE, AMPHETAMINE ASPARTATE, DEXTROAMPHETAMINE SULFATE AND AMPHETAMINE SULFATE 5; 5; 5; 5 MG/1; MG/1; MG/1; MG/1
20 TABLET ORAL 2 TIMES DAILY
Qty: 60 TABLET | Refills: 0 | Status: SHIPPED | OUTPATIENT
Start: 2018-04-10 | End: 2018-05-10

## 2018-04-10 NOTE — MR AVS SNAPSHOT
After Visit Summary   4/10/2018    Jona Zafar    MRN: [id number]           Patient Information     Date Of Birth          1985        Visit Information        Provider Department      4/10/2018 8:40 AM Mick Richter MD [facility]        Today's Diagnoses     Major depressive disorder with single episode, in full remission (H)    -  1    ADHD (attention deficit hyperactivity disorder), inattentive type          Care Instructions    Plan next appt in 3 months with urine test.      Thank you for choosing [facility].  You may be receiving a survey in the mail from [employer] [facility] regarding your visit today.  Please take a few minutes to complete and return the survey to let us know how we are doing.      If you have questions or concerns, please contact us via P[employer] or you can contact your care team at [phone].    Our Clinic hours are:  Monday 6:40 am  to 7:00 pm  Tuesday -Friday 6:40 am to 5:00 pm    The [place] outpatient lab hours are:  Monday - Friday 6:10 am to 4:45 pm  Saturdays 7:00 am to 11:00 am  Appointments are required, call [phone]    If you have clinical questions after hours or would like to schedule an appointment,  call the clinic at [phone].          Follow-ups after your visit        Who to contact     If you have questions or need follow up information about today's clinic visit or your schedule please contact [facility] directly at [phone].  Normal or non-critical lab and imaging results will be communicated to you by [employer], letter or phone within 4 business days after the clinic has received the results. If you do not hear from us within 7 days, please contact the clinic through [email or web address] or phone. If you have a critical or abnormal lab result, we will notify you by phone as soon as possible.  Submit refill requests through Prowl or call your pharmacy and they will forward the refill request to us. Please allow 3  "business days for your refill to be completed.          Additional Information About Your Visit        Crowd Casth[employer] Information     [employer] lets you send messages to your doctor, view your test results, renew your prescriptions, schedule appointments and more. To sign up, go to [email or web address] . Click on \"Log in\" on the left side of the screen, which will take you to the Welcome page. Then click on \"Sign up Now\" on the right side of the page.     You will be asked to enter the access code listed below, as well as some personal information. Please follow the directions to create your username and password.     Your access code is: [email or web address]  Expires: 2018 11:34 AM     Your access code will  in 90 days. If you need help or a new code, please call your [facility] clinic or [phone].        Care EveryWhere ID     This is your Care EveryWhere ID. This could be used by other organizations to access your [facility] medical records  [id number]        Your Vitals Were     Pulse Temperature Height Pulse Oximetry BMI (Body Mass Index)       60 97.8  F (36.6  C) (Tympanic) 5' 9.5\" (1.765 m) 100% 27.48 kg/m2        Blood Pressure from Last 3 Encounters:   04/10/18 132/77   18 127/72   10/13/17 124/65    Weight from Last 3 Encounters:   04/10/18 188 lb 12.8 oz (85.6 kg)   18 178 lb (80.7 kg)   10/13/17 169 lb 9.6 oz (76.9 kg)              We Performed the Following     DEPRESSION ACTION PLAN (DAP)          Today's Medication Changes          These changes are accurate as of 4/10/18  9:27 AM.  If you have any questions, ask your nurse or doctor.               These medicines have changed or have updated prescriptions.        Dose/Directions    * amphetamine-dextroamphetamine 20 MG per tablet   Commonly known as:  ADDERALL   This may have changed:  Another medication with the same name was added. Make sure you understand how and when to take each.   Used for:  ADHD (attention deficit hyperactivity " disorder), inattentive type   Changed by:  Mick Richter MD        Dose:  20 mg   Take 1 tablet (20 mg) by mouth 2 times daily   Quantity:  60 tablet   Refills:  0       * amphetamine-dextroamphetamine 20 MG per tablet   Commonly known as:  ADDERALL   This may have changed:  You were already taking a medication with the same name, and this prescription was added. Make sure you understand how and when to take each.   Used for:  ADHD (attention deficit hyperactivity disorder), inattentive type   Changed by:  Mick Richter MD        Dose:  20 mg   Take 1 tablet (20 mg) by mouth 2 times daily   Quantity:  60 tablet   Refills:  0       * amphetamine-dextroamphetamine 20 MG per tablet   Commonly known as:  ADDERALL   This may have changed:  You were already taking a medication with the same name, and this prescription was added. Make sure you understand how and when to take each.   Used for:  ADHD (attention deficit hyperactivity disorder), inattentive type   Changed by:  Mick Richter MD        Dose:  20 mg   Start taking on:  5/11/2018   Take 1 tablet (20 mg) by mouth 2 times daily   Quantity:  60 tablet   Refills:  0       * amphetamine-dextroamphetamine 20 MG per tablet   Commonly known as:  ADDERALL   This may have changed:  You were already taking a medication with the same name, and this prescription was added. Make sure you understand how and when to take each.   Used for:  ADHD (attention deficit hyperactivity disorder), inattentive type   Changed by:  Mick Richter MD        Dose:  20 mg   Start taking on:  6/11/2018   Take 1 tablet (20 mg) by mouth 2 times daily   Quantity:  60 tablet   Refills:  0       * Notice:  This list has 4 medication(s) that are the same as other medications prescribed for you. Read the directions carefully, and ask your doctor or other care provider to review them with you.         Where to get your medicines      Some of these will need a paper prescription  and others can be bought over the counter.  Ask your nurse if you have questions.     Bring a paper prescription for each of these medications     amphetamine-dextroamphetamine 20 MG per tablet    amphetamine-dextroamphetamine 20 MG per tablet    amphetamine-dextroamphetamine 20 MG per tablet                Primary Care Provider Office Phone # Fax #    Mick Richter [phone] [address] [facility] [address]        Equal Access to Services     KIKI HIGGINS : Hadii aad ku hadasho Soomaali, waaxda luqadaha, qaybta kaalmada adeegyada, waxay idiin hayaan adeeg kharash labeau booth. So [facility] [phone].    ATENCIÓN: Si habla español, tiene a weathers disposición servicios gratuitos de asistencia lingüística. [facility] [phone].    We comply with applicable federal civil rights laws and [place] laws. We do not discriminate on the basis of race, color, national origin, age, disability, sex, sexual orientation, or gender identity.            Thank you!     Thank you for choosing [facility]  for your care. Our goal is always to provide you with excellent care. Hearing back from our patients is one way we can continue to improve our services. Please take a few minutes to complete the written survey that you may receive in the mail after your visit with us. Thank you!             Your Updated Medication List - Protect others around you: Learn how to safely use, store and throw away your medicines at www.disposemymeds.org.          This list is accurate as of 4/10/18  9:27 AM.  Always use your most recent med list.                   Brand Name Dispense Instructions for use Diagnosis    * amphetamine-dextroamphetamine 20 MG per tablet    ADDERALL    60 tablet    Take 1 tablet (20 mg) by mouth 2 times daily    ADHD (attention deficit hyperactivity disorder), inattentive type       * amphetamine-dextroamphetamine 20 MG per tablet    ADDERALL    60 tablet    Take 1 tablet (20 mg) by  mouth 2 times daily    ADHD (attention deficit hyperactivity disorder), inattentive type       * amphetamine-dextroamphetamine 20 MG per tablet   Start taking on:  5/11/2018    ADDERALL    60 tablet    Take 1 tablet (20 mg) by mouth 2 times daily    ADHD (attention deficit hyperactivity disorder), inattentive type       * amphetamine-dextroamphetamine 20 MG per tablet   Start taking on:  6/11/2018    ADDERALL    60 tablet    Take 1 tablet (20 mg) by mouth 2 times daily    ADHD (attention deficit hyperactivity disorder), inattentive type       * Notice:  This list has 4 medication(s) that are the same as other medications prescribed for you. Read the directions carefully, and ask your doctor or other care provider to review them with you.

## 2018-04-10 NOTE — PROGRESS NOTES
SUBJECTIVE:   Jona Zafar is a 33 year old male who presents to clinic today for the following health issues:      Medication Followup of Adderall    Taking Medication as prescribed: yes    Side Effects:  None    Medication Helping Symptoms:  yes       Has been stable on 20mg BID since 2017.  Did go through ADHD eval 2/14/17 with Dr. Covington and diagnosed with ADHD and medication recommended.   Symptoms of ADHD around age 10 and diagnosed but only on medications for few weeks and dad didn't want him on medication.     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 Able to do paperwork and keep organized.    Depression Followup    Status since last visit: Stable since 2016 off medication.    See PHQ-9 for current symptoms.  Other associated symptoms: None    Complicating factors:   Significant life event:  No   Current substance abuse:  None  Anxiety or Panic symptoms:  No    PHQ-9 4/21/2016 10/14/2016 4/18/2017   Total Score 6 0 2   Q9: Suicide Ideation Not at all Not at all Not at all     In the past two weeks have you had thoughts of suicide or self-harm?  No.    Do you have concerns about your personal safety or the safety of others?   No  PHQ-9  English  PHQ-9   Any Language  Suicide Assessment Five-step Evaluation and Treatment (SAFE-T)    Problem list and histories reviewed & adjusted, as indicated.  Additional history: as documented    BP Readings from Last 3 Encounters:   04/10/18 132/77   01/12/18 127/72   10/13/17 124/65    Wt Readings from Last 3 Encounters:   04/10/18 188 lb 12.8 oz (85.6 kg)   01/12/18 178 lb (80.7 kg)   10/13/17 169 lb 9.6 oz (76.9 kg)                    Reviewed and updated as needed this visit by clinical staff  Tobacco  Allergies  Meds  Med Hx  Surg Hx  Fam Hx  Soc Hx      Reviewed and updated as needed this visit by Provider         ROS:  CONSTITUTIONAL: NEGATIVE for fever, chills, change in weight, no appetite changes, no headaches. No trouble sleeping  ENT/MOUTH:  "NEGATIVE for ear, mouth and throat problems  RESP: NEGATIVE for significant cough or SOB  CV: NEGATIVE for chest pain, palpitations or peripheral edema  NEURO: no tremors or tics.  PSYCH: mood stable    OBJECTIVE:     /77  Pulse 60  Temp 97.8  F (36.6  C) (Tympanic)  Ht 5' 9.5\" (1.765 m)  Wt 188 lb 12.8 oz (85.6 kg)  SpO2 100%  BMI 27.48 kg/m2  Body mass index is 27.48 kg/(m^2).  GENERAL: healthy, alert and no distress  CV: regular rate and rhythm, normal S1 S2, no S3 or S4, no murmur, click or rub, no peripheral edema and peripheral pulses strong  NEURO: Normal strength and tone, mentation intact and speech normal    Diagnostic Test Results:  none     ASSESSMENT/PLAN:       Jona was seen today for recheck medication.    Diagnoses and all orders for this visit:    Major depressive disorder with single episode, in full remission (H): in remission  -     DEPRESSION ACTION PLAN (DAP)    ADHD (attention deficit hyperactivity disorder), inattentive type: Very stable  Well controlled  -     amphetamine-dextroamphetamine (ADDERALL) 20 MG per tablet; Take 1 tablet (20 mg) by mouth 2 times daily  -     amphetamine-dextroamphetamine (ADDERALL) 20 MG per tablet; Take 1 tablet (20 mg) by mouth 2 times daily  -     amphetamine-dextroamphetamine (ADDERALL) 20 MG per tablet; Take 1 tablet (20 mg) by mouth 2 times daily        Patient Instructions   Plan next appt in 3 months with urine test.      Thank you for choosing [facility].  You may be receiving a survey in the mail from [employer] regarding your visit today.  Please take a few minutes to complete and return the survey to let us know how we are doing.      If you have questions or concerns, please contact us via [employer] or you can contact your care team at [phone].    Our Clinic hours are:  Monday 6:40 am  to 7:00 pm  Tuesday -Friday 6:40 am to 5:00 pm    The [place] outpatient lab hours are:  Monday - Friday 6:10 am to 4:45 pm  Saturdays 7:00 am to " 11:00 am  Appointments are required, call [phone]    If you have clinical questions after hours or would like to schedule an appointment,  call the clinic at [phone].      Mick Richter MD  [facility]

## 2018-04-10 NOTE — PATIENT INSTRUCTIONS
Plan next appt in 3 months with urine test.      Thank you for choosing [facility].  You may be receiving a survey in the mail from [employer] regarding your visit today.  Please take a few minutes to complete and return the survey to let us know how we are doing.      If you have questions or concerns, please contact us via [employer] or you can contact your care team at [phone].    Our Clinic hours are:  Monday 6:40 am  to 7:00 pm  Tuesday -Friday 6:40 am to 5:00 pm    The [place] outpatient lab hours are:  Monday - Friday 6:10 am to 4:45 pm  Saturdays 7:00 am to 11:00 am  Appointments are required, call [phone]    If you have clinical questions after hours or would like to schedule an appointment,  call the clinic at [phone].

## 2018-04-10 NOTE — LETTER
My Depression Action Plan  Name: Jona Zafar   Date of Birth 1985  Date: 4/10/2018    My doctor: Mick Richter   My clinic: [facility]  [address] [address]  [phone]          GREEN    ZONE   Good Control    What it looks like:     Things are going generally well. You have normal up s and down s. You may even feel depressed from time to time, but bad moods usually last less than a day.   What you need to do:  1. Continue to care for yourself (see self care plan)  2. Check your depression survival kit and update it as needed  3. Follow your physician s recommendations including any medication.  4. Do not stop taking medication unless you consult with your physician first.           YELLOW         ZONE Getting Worse    What it looks like:     Depression is starting to interfere with your life.     It may be hard to get out of bed; you may be starting to isolate yourself from others.    Symptoms of depression are starting to last most all day and this has happened for several days.     You may have suicidal thoughts but they are not constant.   What you need to do:     1. Call your care team, your response to treatment will improve if you keep your care team informed of your progress. Yellow periods are signs an adjustment may need to be made.     2. Continue your self-care, even if you have to fake it!    3. Talk to someone in your support network    4. Open up your depression survival kit           RED    ZONE Medical Alert - Get Help    What it looks like:     Depression is seriously interfering with your life.     You may experience these or other symptoms: You can t get out of bed most days, can t work or engage in other necessary activities, you have trouble taking care of basic hygiene, or basic responsibilities, thoughts of suicide or death that will not go away, self-injurious behavior.     What you need to do:  1. Call your care team and  request a same-day appointment. If they are not available (weekends or after hours) call your local crisis line, emergency room or 911.            Depression Self Care Plan / Survival Kit    Self-Care for Depression  Here s the deal. Your body and mind are really not as separate as most people think.  What you do and think affects how you feel and how you feel influences what you do and think. This means if you do things that people who feel good do, it will help you feel better.  Sometimes this is all it takes.  There is also a place for medication and therapy depending on how severe your depression is, so be sure to consult with your medical provider and/ or Behavioral Health Consultant if your symptoms are worsening or not improving.     In order to better manage my stress, I will:    Exercise  Get some form of exercise, every day. This will help reduce pain and release endorphins, the  feel good  chemicals in your brain. This is almost as good as taking antidepressants!  This is not the same as joining a gym and then never going! (they count on that by the way ) It can be as simple as just going for a walk or doing some gardening, anything that will get you moving.      Hygiene   Maintain good hygiene (Get out of bed in the morning, Make your bed, Brush your teeth, Take a shower, and Get dressed like you were going to work, even if you are unemployed).  If your clothes don't fit try to get ones that do.    Diet  I will strive to eat foods that are good for me, drink plenty of water, and avoid excessive sugar, caffeine, alcohol, and other mood-altering substances.  Some foods that are helpful in depression are: complex carbohydrates, B vitamins, flaxseed, fish or fish oil, fresh fruits and vegetables.    Psychotherapy  I agree to participate in Individual Therapy (if recommended).    Medication  If prescribed medications, I agree to take them.  Missing doses can result in serious side effects.  I understand that  drinking alcohol, or other illicit drug use, may cause potential side effects.  I will not stop my medication abruptly without first discussing it with my provider.    Staying Connected With Others  I will stay in touch with my friends, family members, and my primary care provider/team.    Use your imagination  Be creative.  We all have a creative side; it doesn t matter if it s oil painting, sand castles, or mud pies! This will also kick up the endorphins.    Witness Beauty  (AKA stop and smell the roses) Take a look outside, even in mid-winter. Notice colors, textures. Watch the squirrels and birds.     Service to others  Be of service to others.  There is always someone else in need.  By helping others we can  get out of ourselves  and remember the really important things.  This also provides opportunities for practicing all the other parts of the program.    Humor  Laugh and be silly!  Adjust your TV habits for less news and crime-drama and more comedy.    Control your stress  Try breathing deep, massage therapy, biofeedback, and meditation. Find time to relax each day.     My support system    Clinic Contact:  Phone number:    Contact 1:  Phone number:    Contact 2:  Phone number:    Shinto/:  Phone number:    Therapist:  Phone number:    Local crisis center:    Phone number:    Other community support:  Phone number:

## 2018-04-10 NOTE — NURSING NOTE
"Chief Complaint   Patient presents with     Recheck Medication     Adderall       Initial /77  Pulse 60  Temp 97.8  F (36.6  C) (Tympanic)  Ht 5' 9.5\" (1.765 m)  Wt 188 lb 12.8 oz (85.6 kg)  SpO2 100%  BMI 27.48 kg/m2 Estimated body mass index is 27.48 kg/(m^2) as calculated from the following:    Height as of this encounter: 5' 9.5\" (1.765 m).    Weight as of this encounter: 188 lb 12.8 oz (85.6 kg).  Medication Reconciliation: complete  "

## 2018-04-11 ASSESSMENT — PATIENT HEALTH QUESTIONNAIRE - PHQ9: SUM OF ALL RESPONSES TO PHQ QUESTIONS 1-9: 0

## 2018-07-17 ENCOUNTER — OFFICE VISIT (OUTPATIENT)
Dept: FAMILY MEDICINE | Facility: CLINIC | Age: 33
End: 2018-07-17
Payer: COMMERCIAL

## 2018-07-17 VITALS
TEMPERATURE: 97.3 F | WEIGHT: 184.4 LBS | HEART RATE: 63 BPM | SYSTOLIC BLOOD PRESSURE: 124 MMHG | OXYGEN SATURATION: 98 % | HEIGHT: 70 IN | DIASTOLIC BLOOD PRESSURE: 70 MMHG | BODY MASS INDEX: 26.4 KG/M2

## 2018-07-17 DIAGNOSIS — F90.0 ADHD (ATTENTION DEFICIT HYPERACTIVITY DISORDER), INATTENTIVE TYPE: Primary | ICD-10-CM

## 2018-07-17 PROCEDURE — 99213 OFFICE O/P EST LOW 20 MIN: CPT | Performed by: FAMILY MEDICINE

## 2018-07-17 RX ORDER — DEXTROAMPHETAMINE SACCHARATE, AMPHETAMINE ASPARTATE, DEXTROAMPHETAMINE SULFATE AND AMPHETAMINE SULFATE 5; 5; 5; 5 MG/1; MG/1; MG/1; MG/1
20 TABLET ORAL 2 TIMES DAILY
Qty: 60 TABLET | Refills: 0 | Status: CANCELLED | OUTPATIENT
Start: 2018-07-17 | End: 2018-08-16

## 2018-07-17 RX ORDER — DEXTROAMPHETAMINE SACCHARATE, AMPHETAMINE ASPARTATE MONOHYDRATE, DEXTROAMPHETAMINE SULFATE AND AMPHETAMINE SULFATE 5; 5; 5; 5 MG/1; MG/1; MG/1; MG/1
40 CAPSULE, EXTENDED RELEASE ORAL DAILY
Qty: 60 CAPSULE | Refills: 0 | Status: SHIPPED | OUTPATIENT
Start: 2018-08-17 | End: 2018-09-16

## 2018-07-17 RX ORDER — DEXTROAMPHETAMINE SACCHARATE, AMPHETAMINE ASPARTATE, DEXTROAMPHETAMINE SULFATE AND AMPHETAMINE SULFATE 5; 5; 5; 5 MG/1; MG/1; MG/1; MG/1
20 TABLET ORAL 2 TIMES DAILY
Qty: 60 TABLET | Refills: 0 | Status: CANCELLED | OUTPATIENT
Start: 2018-08-17 | End: 2018-09-16

## 2018-07-17 RX ORDER — DEXTROAMPHETAMINE SACCHARATE, AMPHETAMINE ASPARTATE MONOHYDRATE, DEXTROAMPHETAMINE SULFATE AND AMPHETAMINE SULFATE 5; 5; 5; 5 MG/1; MG/1; MG/1; MG/1
40 CAPSULE, EXTENDED RELEASE ORAL DAILY
Qty: 60 CAPSULE | Refills: 0 | Status: SHIPPED | OUTPATIENT
Start: 2018-07-17 | End: 2018-08-16

## 2018-07-17 RX ORDER — DEXTROAMPHETAMINE SACCHARATE, AMPHETAMINE ASPARTATE, DEXTROAMPHETAMINE SULFATE AND AMPHETAMINE SULFATE 5; 5; 5; 5 MG/1; MG/1; MG/1; MG/1
20 TABLET ORAL 2 TIMES DAILY
Qty: 60 TABLET | Refills: 0 | Status: CANCELLED | OUTPATIENT
Start: 2018-09-17 | End: 2018-10-17

## 2018-07-17 RX ORDER — DEXTROAMPHETAMINE SACCHARATE, AMPHETAMINE ASPARTATE MONOHYDRATE, DEXTROAMPHETAMINE SULFATE AND AMPHETAMINE SULFATE 5; 5; 5; 5 MG/1; MG/1; MG/1; MG/1
40 CAPSULE, EXTENDED RELEASE ORAL DAILY
Qty: 60 CAPSULE | Refills: 0 | Status: SHIPPED | OUTPATIENT
Start: 2018-09-17 | End: 2018-10-17

## 2018-07-17 NOTE — MR AVS SNAPSHOT
After Visit Summary   7/17/2018    Jona Zafar    MRN: [id number]           Patient Information     Date Of Birth          1985        Visit Information        Provider Department      7/17/2018 8:20 AM Mick Richter MD [facility]        Today's Diagnoses     ADHD (attention deficit hyperactivity disorder), inattentive type    -  1      Care Instructions      Have pharmacy call if the adderall 40mg XR not covered and we can switch back to the 20mg IR twice a day.  Next clinic appointment in 3 month for recheck and refills.    Thank you for choosing [facility].  You may be receiving a survey in the mail from [employer] regarding your visit today.  Please take a few minutes to complete and return the survey to let us know how we are doing.      If you have questions or concerns, please contact us via [employer] or you can contact your care team at [phone].    Our Clinic hours are:  Monday 6:40 am  to 7:00 pm  Tuesday -Friday 6:40 am to 5:00 pm    The [place] outpatient lab hours are:  Monday - Friday 6:10 am to 4:45 pm  Saturdays 7:00 am to 11:00 am  Appointments are required, call [phone]    If you have clinical questions after hours or would like to schedule an appointment,  call the clinic at [phone].          Follow-ups after your visit        Who to contact     If you have questions or need follow up information about today's clinic visit or your schedule please contact [facility] directly at [phone].  Normal or non-critical lab and imaging results will be communicated to you by MyChart, letter or phone within 4 business days after the clinic has received the results. If you do not hear from us within 7 days, please contact the clinic through [employer]soft[employer] or phone. If you have a critical or abnormal lab result, we will notify you by phone as soon as possible.  Submit refill requests through [employer] or call your pharmacy and they will  "forward the refill request to us. Please allow 3 business days for your refill to be completed.          Additional Information About Your Visit        Care EveryWhere ID     This is your Care EveryWhere ID. This could be used by other organizations to access your [facility] medical records  [id number]        Your Vitals Were     Pulse Temperature Height Pulse Oximetry BMI (Body Mass Index)       63 97.3  F (36.3  C) (Tympanic) 5' 9.5\" (1.765 m) 98% 26.84 kg/m2        Blood Pressure from Last 3 Encounters:   07/17/18 124/70   04/10/18 132/77   01/12/18 127/72    Weight from Last 3 Encounters:   07/17/18 184 lb 6.4 oz (83.6 kg)   04/10/18 188 lb 12.8 oz (85.6 kg)   01/12/18 178 lb (80.7 kg)              Today, you had the following     No orders found for display         Today's Medication Changes          These changes are accurate as of 7/17/18  8:54 AM.  If you have any questions, ask your nurse or doctor.               Start taking these medicines.        Dose/Directions    * amphetamine-dextroamphetamine 20 MG per 24 hr capsule   Commonly known as:  ADDERALL XR   Used for:  ADHD (attention deficit hyperactivity disorder), inattentive type   Started by:  Mick Richter MD        Dose:  40 mg   Take 2 capsules (40 mg) by mouth daily   Quantity:  60 capsule   Refills:  0       * amphetamine-dextroamphetamine 20 MG per 24 hr capsule   Commonly known as:  ADDERALL XR   Used for:  ADHD (attention deficit hyperactivity disorder), inattentive type   Started by:  Mick Richter MD        Dose:  40 mg   Start taking on:  8/17/2018   Take 2 capsules (40 mg) by mouth daily   Quantity:  60 capsule   Refills:  0       * amphetamine-dextroamphetamine 20 MG per 24 hr capsule   Commonly known as:  ADDERALL XR   Used for:  ADHD (attention deficit hyperactivity disorder), inattentive type   Started by:  Mick Richter MD        Dose:  40 mg   Start taking on:  9/17/2018   Take 2 capsules (40 mg) by mouth daily "   Quantity:  60 capsule   Refills:  0       * Notice:  This list has 3 medication(s) that are the same as other medications prescribed for you. Read the directions carefully, and ask your doctor or other care provider to review them with you.         Where to get your medicines      Some of these will need a paper prescription and others can be bought over the counter.  Ask your nurse if you have questions.     Bring a paper prescription for each of these medications     amphetamine-dextroamphetamine 20 MG per 24 hr capsule    amphetamine-dextroamphetamine 20 MG per 24 hr capsule    amphetamine-dextroamphetamine 20 MG per 24 hr capsule                Primary Care Provider Office Phone # Fax #    Mick Richter [phone] [address] [facility] [address]        Equal Access to Services     KIKI HIGGINS : Hadii stephanie christenseno Sosonya, waaxda luqadaha, qaybta kaalmada adeegyada, afsaneh borden . So [facility] [phone].    ATENCIÓN: Si habla español, tiene a weathers disposición servicios gratuitos de asistencia lingüística. [facility] [phone].    We comply with applicable federal civil rights laws and [place] laws. We do not discriminate on the basis of race, color, national origin, age, disability, sex, sexual orientation, or gender identity.            Thank you!     Thank you for choosing [facility]  for your care. Our goal is always to provide you with excellent care. Hearing back from our patients is one way we can continue to improve our services. Please take a few minutes to complete the written survey that you may receive in the mail after your visit with us. Thank you!             Your Updated Medication List - Protect others around you: Learn how to safely use, store and throw away your medicines at www.disposemymeds.org.          This list is accurate as of 7/17/18  8:54 AM.  Always use your most recent med list.                   Brand  Name Dispense Instructions for use Diagnosis    * amphetamine-dextroamphetamine 20 MG per 24 hr capsule    ADDERALL XR    60 capsule    Take 2 capsules (40 mg) by mouth daily    ADHD (attention deficit hyperactivity disorder), inattentive type       * amphetamine-dextroamphetamine 20 MG per 24 hr capsule   Start taking on:  8/17/2018    ADDERALL XR    60 capsule    Take 2 capsules (40 mg) by mouth daily    ADHD (attention deficit hyperactivity disorder), inattentive type       * amphetamine-dextroamphetamine 20 MG per 24 hr capsule   Start taking on:  9/17/2018    ADDERALL XR    60 capsule    Take 2 capsules (40 mg) by mouth daily    ADHD (attention deficit hyperactivity disorder), inattentive type       * Notice:  This list has 3 medication(s) that are the same as other medications prescribed for you. Read the directions carefully, and ask your doctor or other care provider to review them with you.

## 2018-07-17 NOTE — PATIENT INSTRUCTIONS
Have pharmacy call if the adderall 40mg XR not covered and we can switch back to the 20mg IR twice a day.  Next clinic appointment in 3 month for recheck and refills.  Call if medication concerns or questions.    Thank you for choosing [facility].  You may be receiving a survey in the mail from Rachel Figueroa regarding your visit today.  Please take a few minutes to complete and return the survey to let us know how we are doing.      If you have questions or concerns, please contact us via [employer] or you can contact your care team at [phone].    Our Clinic hours are:  Monday 6:40 am  to 7:00 pm  Tuesday -Friday 6:40 am to 5:00 pm    The [place] outpatient lab hours are:  Monday - Friday 6:10 am to 4:45 pm  Saturdays 7:00 am to 11:00 am  Appointments are required, call [phone]    If you have clinical questions after hours or would like to schedule an appointment,  call the clinic at [phone].

## 2018-07-17 NOTE — PROGRESS NOTES
"  SUBJECTIVE:   Jona Zafar is a 33 year old male who presents to clinic today for the following health issues:      Medication Followup of Adderall    Taking Medication as prescribed: yes    Side Effects:  None    Medication Helping Symptoms:  yes     Has been stable on 20mg BID since 2017. Wondering about doing once daily dosing.  Did go through ADHD eval 2/14/17 with Dr. Covington and diagnosed with ADHD and medication recommended.   Symptoms of ADHD around age 10 and diagnosed but only on medications for few weeks and dad didn't want him on medication.      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 Able to do paperwork and keep organized.    Problem list and histories reviewed & adjusted, as indicated.  Additional history: as documented    BP Readings from Last 3 Encounters:   07/17/18 124/70   04/10/18 132/77   01/12/18 127/72    Wt Readings from Last 3 Encounters:   07/17/18 184 lb 6.4 oz (83.6 kg)   04/10/18 188 lb 12.8 oz (85.6 kg)   01/12/18 178 lb (80.7 kg)                    Reviewed and updated as needed this visit by clinical staff  Tobacco  Allergies  Meds       Reviewed and updated as needed this visit by Provider         ROS:  CONSTITUTIONAL: NEGATIVE for fever, chills, change in weight, no appetite changes, no headaches. No trouble sleeping  ENT/MOUTH: NEGATIVE for ear, mouth and throat problems  RESP: NEGATIVE for significant cough or SOB  CV: NEGATIVE for chest pain, palpitations or peripheral edema  NEURO: no tremors or tics.  PSYCH: mood stable    OBJECTIVE:     /70  Pulse 63  Temp 97.3  F (36.3  C) (Tympanic)  Ht 5' 9.5\" (1.765 m)  Wt 184 lb 6.4 oz (83.6 kg)  SpO2 98%  BMI 26.84 kg/m2  Body mass index is 26.84 kg/(m^2).  GENERAL: healthy, alert and no distress  PSYCH: mentation appears normal, affect normal/bright    Diagnostic Test Results:  none     ASSESSMENT/PLAN:       Jona was seen today for refill request.    Diagnoses and all orders for this visit:    ADHD " (attention deficit hyperactivity disorder), inattentive type: well controlled, switch to XR for once daily dosing.  -     amphetamine-dextroamphetamine (ADDERALL XR) 20 MG per 24 hr capsule; Take 2 capsules (40 mg) by mouth daily  -     amphetamine-dextroamphetamine (ADDERALL XR) 20 MG per 24 hr capsule; Take 2 capsules (40 mg) by mouth daily  -     amphetamine-dextroamphetamine (ADDERALL XR) 20 MG per 24 hr capsule; Take 2 capsules (40 mg) by mouth daily      Patient Instructions     Have pharmacy call if the adderall 40mg XR not covered and we can switch back to the 20mg IR twice a day.  Next clinic appointment in 3 month for recheck and refills.  Call if medication concerns or questions.    Thank you for choosing [facility]  You may be receiving a survey in the mail from [address] regarding your visit today.  Please take a few minutes to complete and return the survey to let us know how we are doing.      If you have questions or concerns, please contact us via [employer] or you can contact your care team at [phone].    Our Clinic hours are:  Monday 6:40 am  to 7:00 pm  Tuesday -Friday 6:40 am to 5:00 pm    The [place] outpatient lab hours are:  Monday - Friday 6:10 am to 4:45 pm  Saturdays 7:00 am to 11:00 am  Appointments are required, call [phone]    If you have clinical questions after hours or would like to schedule an appointment,  call the clinic at [phone].      Mick Richter MD  [facility]

## 2018-10-16 ENCOUNTER — OFFICE VISIT (OUTPATIENT)
Dept: FAMILY MEDICINE | Facility: CLINIC | Age: 33
End: 2018-10-16
Payer: COMMERCIAL

## 2018-10-16 VITALS
BODY MASS INDEX: 27.35 KG/M2 | DIASTOLIC BLOOD PRESSURE: 82 MMHG | TEMPERATURE: 97.9 F | RESPIRATION RATE: 16 BRPM | HEIGHT: 70 IN | HEART RATE: 66 BPM | SYSTOLIC BLOOD PRESSURE: 130 MMHG | WEIGHT: 191 LBS | OXYGEN SATURATION: 99 %

## 2018-10-16 DIAGNOSIS — F90.0 ADHD (ATTENTION DEFICIT HYPERACTIVITY DISORDER), INATTENTIVE TYPE: ICD-10-CM

## 2018-10-16 DIAGNOSIS — Z23 NEED FOR PROPHYLACTIC VACCINATION AND INOCULATION AGAINST INFLUENZA: Primary | ICD-10-CM

## 2018-10-16 PROCEDURE — 99213 OFFICE O/P EST LOW 20 MIN: CPT | Performed by: FAMILY MEDICINE

## 2018-10-16 PROCEDURE — 90471 IMMUNIZATION ADMIN: CPT | Performed by: FAMILY MEDICINE

## 2018-10-16 PROCEDURE — 90686 IIV4 VACC NO PRSV 0.5 ML IM: CPT | Performed by: FAMILY MEDICINE

## 2018-10-16 RX ORDER — DEXTROAMPHETAMINE SACCHARATE, AMPHETAMINE ASPARTATE MONOHYDRATE, DEXTROAMPHETAMINE SULFATE AND AMPHETAMINE SULFATE 5; 5; 5; 5 MG/1; MG/1; MG/1; MG/1
40 CAPSULE, EXTENDED RELEASE ORAL DAILY
Qty: 60 CAPSULE | Refills: 0 | Status: SHIPPED | OUTPATIENT
Start: 2018-12-17 | End: 2019-01-16

## 2018-10-16 RX ORDER — DEXTROAMPHETAMINE SACCHARATE, AMPHETAMINE ASPARTATE MONOHYDRATE, DEXTROAMPHETAMINE SULFATE AND AMPHETAMINE SULFATE 5; 5; 5; 5 MG/1; MG/1; MG/1; MG/1
40 CAPSULE, EXTENDED RELEASE ORAL DAILY
Qty: 60 CAPSULE | Refills: 0 | Status: CANCELLED | OUTPATIENT
Start: 2018-10-16

## 2018-10-16 RX ORDER — DEXTROAMPHETAMINE SACCHARATE, AMPHETAMINE ASPARTATE MONOHYDRATE, DEXTROAMPHETAMINE SULFATE AND AMPHETAMINE SULFATE 5; 5; 5; 5 MG/1; MG/1; MG/1; MG/1
40 CAPSULE, EXTENDED RELEASE ORAL DAILY
Qty: 60 CAPSULE | Refills: 0 | Status: SHIPPED | OUTPATIENT
Start: 2018-11-16 | End: 2018-12-16

## 2018-10-16 RX ORDER — DEXTROAMPHETAMINE SACCHARATE, AMPHETAMINE ASPARTATE MONOHYDRATE, DEXTROAMPHETAMINE SULFATE AND AMPHETAMINE SULFATE 5; 5; 5; 5 MG/1; MG/1; MG/1; MG/1
40 CAPSULE, EXTENDED RELEASE ORAL DAILY
Qty: 60 CAPSULE | Refills: 0 | Status: SHIPPED | OUTPATIENT
Start: 2018-10-16 | End: 2018-11-15

## 2018-10-16 NOTE — MR AVS SNAPSHOT
After Visit Summary   10/16/2018    Jona Zafar    MRN: [id number]           Patient Information     Date Of Birth          1985        Visit Information        Provider Department      10/16/2018 9:40 AM Mick Richter MD [facility]        Today's Diagnoses     ADHD (attention deficit hyperactivity disorder), inattentive type          Care Instructions          Thank you for choosing [facility].  You may be receiving a survey in the mail from [facility] regarding your visit today.  Please take a few minutes to complete and return the survey to let us know how we are doing.      If you have questions or concerns, please contact us via [employer] or you can contact your care team at [phone].    Our Clinic hours are:  Monday 6:40 am  to 7:00 pm  Tuesday -Friday 6:40 am to 5:00 pm    The [place] outpatient lab hours are:  Monday - Friday 6:10 am to 4:45 pm  Saturdays 7:00 am to 11:00 am  Appointments are required, call [phone]    If you have clinical questions after hours or would like to schedule an appointment,  call the clinic at [phone].          Follow-ups after your visit        Follow-up notes from your care team     Return in about 3 months (around 1/16/2019).      Who to contact     If you have questions or need follow up information about today's clinic visit or your schedule please contact [facility] directly at [phone].  Normal or non-critical lab and imaging results will be communicated to you by MyChart, letter or phone within 4 business days after the clinic has received the results. If you do not hear from us within 7 days, please contact the clinic through [employer] or phone. If you have a critical or abnormal lab result, we will notify you by phone as soon as possible.  Submit refill requests through [employer] or call your pharmacy and they will forward the refill request to us. Please allow 3 business days for your refill to  "be completed.          Additional Information About Your Visit        Care EveryWhere ID     This is your Care EveryWhere ID. This could be used by other organizations to access your [place] medical records  [id number]        Your Vitals Were     Pulse Temperature Respirations Height Pulse Oximetry BMI (Body Mass Index)    66 97.9  F (36.6  C) (Tympanic) 16 5' 9.5\" (1.765 m) 99% 27.8 kg/m2       Blood Pressure from Last 3 Encounters:   10/16/18 130/82   07/17/18 124/70   04/10/18 132/77    Weight from Last 3 Encounters:   10/16/18 191 lb (86.6 kg)   07/17/18 184 lb 6.4 oz (83.6 kg)   04/10/18 188 lb 12.8 oz (85.6 kg)              Today, you had the following     No orders found for display         Today's Medication Changes          These changes are accurate as of 10/16/18 10:06 AM.  If you have any questions, ask your nurse or doctor.               These medicines have changed or have updated prescriptions.        Dose/Directions    * amphetamine-dextroamphetamine 20 MG per 24 hr capsule   Commonly known as:  ADDERALL XR   This may have changed:  Another medication with the same name was added. Make sure you understand how and when to take each.   Used for:  ADHD (attention deficit hyperactivity disorder), inattentive type   Changed by:  Mick Richter MD        Dose:  40 mg   Take 2 capsules (40 mg) by mouth daily   Quantity:  60 capsule   Refills:  0       * amphetamine-dextroamphetamine 20 MG per 24 hr capsule   Commonly known as:  ADDERALL XR   This may have changed:  You were already taking a medication with the same name, and this prescription was added. Make sure you understand how and when to take each.   Used for:  ADHD (attention deficit hyperactivity disorder), inattentive type   Changed by:  Mick Richter MD        Dose:  40 mg   Take 2 capsules (40 mg) by mouth daily   Quantity:  60 capsule   Refills:  0       * amphetamine-dextroamphetamine 20 MG per 24 hr capsule   Commonly known as: "  ADDERALL XR   This may have changed:  You were already taking a medication with the same name, and this prescription was added. Make sure you understand how and when to take each.   Used for:  ADHD (attention deficit hyperactivity disorder), inattentive type   Changed by:  Mick Richter MD        Dose:  40 mg   Start taking on:  11/16/2018   Take 2 capsules (40 mg) by mouth daily   Quantity:  60 capsule   Refills:  0       * amphetamine-dextroamphetamine 20 MG per 24 hr capsule   Commonly known as:  ADDERALL XR   This may have changed:  You were already taking a medication with the same name, and this prescription was added. Make sure you understand how and when to take each.   Used for:  ADHD (attention deficit hyperactivity disorder), inattentive type   Changed by:  Mick Richter MD        Dose:  40 mg   Start taking on:  12/17/2018   Take 2 capsules (40 mg) by mouth daily   Quantity:  60 capsule   Refills:  0       * Notice:  This list has 4 medication(s) that are the same as other medications prescribed for you. Read the directions carefully, and ask your doctor or other care provider to review them with you.         Where to get your medicines      Some of these will need a paper prescription and others can be bought over the counter.  Ask your nurse if you have questions.     Bring a paper prescription for each of these medications     amphetamine-dextroamphetamine 20 MG per 24 hr capsule    amphetamine-dextroamphetamine 20 MG per 24 hr capsule    amphetamine-dextroamphetamine 20 MG per 24 hr capsule                Primary Care Provider Office Phone # Fax #    Mick Richter [phone] [address] [id number]        Equal Access to Services     [facility] : Magnus Dahl, wajacobo novoa, qaafsaneh montelongo. So [facility] [phone].    ATENCIÓN: Si habla español, tiene a weathers disposición  servicios gratuitos de asistencia lingüística. Brigid chua [phone].    We comply with applicable federal civil rights laws and [place] laws. We do not discriminate on the basis of race, color, national origin, age, disability, sex, sexual orientation, or gender identity.            Thank you!     Thank you for choosing [facility]  for your care. Our goal is always to provide you with excellent care. Hearing back from our patients is one way we can continue to improve our services. Please take a few minutes to complete the written survey that you may receive in the mail after your visit with us. Thank you!             Your Updated Medication List - Protect others around you: Learn how to safely use, store and throw away your medicines at www.disposemymeds.org.          This list is accurate as of 10/16/18 10:06 AM.  Always use your most recent med list.                   Brand Name Dispense Instructions for use Diagnosis    * amphetamine-dextroamphetamine 20 MG per 24 hr capsule    ADDERALL XR    60 capsule    Take 2 capsules (40 mg) by mouth daily    ADHD (attention deficit hyperactivity disorder), inattentive type       * amphetamine-dextroamphetamine 20 MG per 24 hr capsule    ADDERALL XR    60 capsule    Take 2 capsules (40 mg) by mouth daily    ADHD (attention deficit hyperactivity disorder), inattentive type       * amphetamine-dextroamphetamine 20 MG per 24 hr capsule   Start taking on:  11/16/2018    ADDERALL XR    60 capsule    Take 2 capsules (40 mg) by mouth daily    ADHD (attention deficit hyperactivity disorder), inattentive type       * amphetamine-dextroamphetamine 20 MG per 24 hr capsule   Start taking on:  12/17/2018    ADDERALL XR    60 capsule    Take 2 capsules (40 mg) by mouth daily    ADHD (attention deficit hyperactivity disorder), inattentive type       * Notice:  This list has 4 medication(s) that are the same as other medications prescribed for you. Read the directions  carefully, and ask your doctor or other care provider to review them with you.

## 2018-10-16 NOTE — PATIENT INSTRUCTIONS
Thank you for choosing [facility].  You may be receiving a survey in the mail from Rachel Figueroa regarding your visit today.  Please take a few minutes to complete and return the survey to let us know how we are doing.      If you have questions or concerns, please contact us via [employer] or you can contact your care team at [phone].    Our Clinic hours are:  Monday 6:40 am  to 7:00 pm  Tuesday -Friday 6:40 am to 5:00 pm    The [place] outpatient lab hours are:  Monday - Friday 6:10 am to 4:45 pm  Saturdays 7:00 am to 11:00 am  Appointments are required, call [phone]    If you have clinical questions after hours or would like to schedule an appointment,  call the clinic at [phone].

## 2018-10-16 NOTE — PROGRESS NOTES
"  SUBJECTIVE:   Jona Zafar is a 33 year old male who presents to clinic today for the following health issues:      Medication Followup of Adderall XR    Taking Medication as prescribed: yes    Side Effects:  None    Medication Helping Symptoms:  yes     Has been stable on 20mg BID since 2017. 7/2018 switched to 40mg XR daily which has gone well.  Did go through ADHD eval 2/14/17 with Dr. Covington and diagnosed with ADHD and medication recommended.   Symptoms of ADHD around age 10 and diagnosed but only on medications for few weeks and dad didn't want him on medication.      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 Able to do paperwork and keep organized.      Problem list and histories reviewed & adjusted, as indicated.  Additional history: as documented    BP Readings from Last 3 Encounters:   10/16/18 130/82   07/17/18 124/70   04/10/18 132/77    Wt Readings from Last 3 Encounters:   10/16/18 191 lb (86.6 kg)   07/17/18 184 lb 6.4 oz (83.6 kg)   04/10/18 188 lb 12.8 oz (85.6 kg)                    Reviewed and updated as needed this visit by clinical staff  Tobacco  Allergies  Meds       Reviewed and updated as needed this visit by Provider         ROS:  CONSTITUTIONAL: NEGATIVE for fever, chills, change in weight, no headaches, no appetite changes  ENT/MOUTH: NEGATIVE for ear, mouth and throat problems  RESP: NEGATIVE for significant cough or SOB  CV: NEGATIVE for chest pain, palpitations or peripheral edema  NEURO: NEGATIVE for weakness, dizziness or paresthesias, no tremor or tic.  PSYCHIATRIC: NEGATIVE for changes in mood or affect    OBJECTIVE:     /82  Pulse 66  Temp 97.9  F (36.6  C) (Tympanic)  Resp 16  Ht 5' 9.5\" (1.765 m)  Wt 191 lb (86.6 kg)  SpO2 99%  BMI 27.8 kg/m2  Body mass index is 27.8 kg/(m^2).  GENERAL: healthy, alert and no distress  RESP: lungs clear to auscultation - no rales, rhonchi or wheezes  CV: regular rate and rhythm, normal S1 S2, no S3 or S4, no murmur, " click or rub, no peripheral edema and peripheral pulses strong  NEURO: Normal strength and tone, mentation intact and speech normal  PSYCH: mentation appears normal, affect normal/bright    Diagnostic Test Results:  none     ASSESSMENT/PLAN:       Jona was seen today for refill request and flu shot.    Diagnoses and all orders for this visit:    ADHD (attention deficit hyperactivity disorder), inattentive type: stable, refill for 3 months.  -     amphetamine-dextroamphetamine (ADDERALL XR) 20 MG per 24 hr capsule; Take 2 capsules (40 mg) by mouth daily  -     amphetamine-dextroamphetamine (ADDERALL XR) 20 MG per 24 hr capsule; Take 2 capsules (40 mg) by mouth daily  -     amphetamine-dextroamphetamine (ADDERALL XR) 20 MG per 24 hr capsule; Take 2 capsules (40 mg) by mouth daily            Mick Richter MD  [facility]

## 2019-01-16 ENCOUNTER — OFFICE VISIT (OUTPATIENT)
Dept: FAMILY MEDICINE | Facility: CLINIC | Age: 34
End: 2019-01-16
Payer: COMMERCIAL

## 2019-01-16 VITALS
HEIGHT: 71 IN | TEMPERATURE: 97.3 F | WEIGHT: 186.4 LBS | DIASTOLIC BLOOD PRESSURE: 80 MMHG | BODY MASS INDEX: 26.1 KG/M2 | HEART RATE: 65 BPM | SYSTOLIC BLOOD PRESSURE: 130 MMHG | OXYGEN SATURATION: 99 % | RESPIRATION RATE: 16 BRPM

## 2019-01-16 DIAGNOSIS — F90.0 ADHD (ATTENTION DEFICIT HYPERACTIVITY DISORDER), INATTENTIVE TYPE: Primary | ICD-10-CM

## 2019-01-16 PROCEDURE — 99213 OFFICE O/P EST LOW 20 MIN: CPT | Performed by: FAMILY MEDICINE

## 2019-01-16 RX ORDER — DEXTROAMPHETAMINE SACCHARATE, AMPHETAMINE ASPARTATE MONOHYDRATE, DEXTROAMPHETAMINE SULFATE AND AMPHETAMINE SULFATE 5; 5; 5; 5 MG/1; MG/1; MG/1; MG/1
40 CAPSULE, EXTENDED RELEASE ORAL DAILY
Qty: 60 CAPSULE | Refills: 0 | Status: SHIPPED | OUTPATIENT
Start: 2019-01-16 | End: 2019-02-15

## 2019-01-16 RX ORDER — DEXTROAMPHETAMINE SACCHARATE, AMPHETAMINE ASPARTATE MONOHYDRATE, DEXTROAMPHETAMINE SULFATE AND AMPHETAMINE SULFATE 5; 5; 5; 5 MG/1; MG/1; MG/1; MG/1
40 CAPSULE, EXTENDED RELEASE ORAL DAILY
Qty: 60 CAPSULE | Refills: 0 | Status: SHIPPED | OUTPATIENT
Start: 2019-03-19 | End: 2019-04-18

## 2019-01-16 RX ORDER — DEXTROAMPHETAMINE SACCHARATE, AMPHETAMINE ASPARTATE MONOHYDRATE, DEXTROAMPHETAMINE SULFATE AND AMPHETAMINE SULFATE 5; 5; 5; 5 MG/1; MG/1; MG/1; MG/1
40 CAPSULE, EXTENDED RELEASE ORAL DAILY
Qty: 60 CAPSULE | Refills: 0 | Status: SHIPPED | OUTPATIENT
Start: 2019-02-16 | End: 2019-03-18

## 2019-01-16 RX ORDER — DEXTROAMPHETAMINE SACCHARATE, AMPHETAMINE ASPARTATE MONOHYDRATE, DEXTROAMPHETAMINE SULFATE AND AMPHETAMINE SULFATE 5; 5; 5; 5 MG/1; MG/1; MG/1; MG/1
40 CAPSULE, EXTENDED RELEASE ORAL DAILY
Qty: 60 CAPSULE | Refills: 0 | Status: CANCELLED | OUTPATIENT
Start: 2019-01-16

## 2019-01-16 ASSESSMENT — ANXIETY QUESTIONNAIRES
5. BEING SO RESTLESS THAT IT IS HARD TO SIT STILL: NOT AT ALL
IF YOU CHECKED OFF ANY PROBLEMS ON THIS QUESTIONNAIRE, HOW DIFFICULT HAVE THESE PROBLEMS MADE IT FOR YOU TO DO YOUR WORK, TAKE CARE OF THINGS AT HOME, OR GET ALONG WITH OTHER PEOPLE: NOT DIFFICULT AT ALL
3. WORRYING TOO MUCH ABOUT DIFFERENT THINGS: NOT AT ALL
7. FEELING AFRAID AS IF SOMETHING AWFUL MIGHT HAPPEN: NOT AT ALL
GAD7 TOTAL SCORE: 0
6. BECOMING EASILY ANNOYED OR IRRITABLE: NOT AT ALL
1. FEELING NERVOUS, ANXIOUS, OR ON EDGE: NOT AT ALL
2. NOT BEING ABLE TO STOP OR CONTROL WORRYING: NOT AT ALL

## 2019-01-16 ASSESSMENT — MIFFLIN-ST. JEOR: SCORE: 1814.69

## 2019-01-16 ASSESSMENT — PATIENT HEALTH QUESTIONNAIRE - PHQ9
SUM OF ALL RESPONSES TO PHQ QUESTIONS 1-9: 1
5. POOR APPETITE OR OVEREATING: NOT AT ALL

## 2019-01-16 NOTE — PROGRESS NOTES
"  SUBJECTIVE:   Jona Zafar is a 33 year old male who presents to clinic today for the following health issues:    Medication Followup of Adderall 20 MG XR    Taking Medication as prescribed: yes    Side Effects:  None    Medication Helping Symptoms:  yes     Has been stable on 20mg BID since . 2018 switched to 40mg XR daily which has gone well.  Did go through ADHD eval 17 with Dr. Covington and diagnosed with ADHD and medication recommended.   Symptoms of ADHD around age 10 and diagnosed but only on medications for few weeks and dad didn't want him on medication.      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 Able to do paperwork and keep organized.    Did take medication last this morning.    Caffeine: 1-2 monster a day  Smokinppd trying to cut back  Alcohol: none  Drug: none       Problem list and histories reviewed & adjusted, as indicated.  Additional history: as documented    BP Readings from Last 3 Encounters:   19 130/80   10/16/18 130/82   18 124/70    Wt Readings from Last 3 Encounters:   19 84.6 kg (186 lb 6.4 oz)   10/16/18 86.6 kg (191 lb)   18 83.6 kg (184 lb 6.4 oz)                    Reviewed and updated as needed this visit by clinical staff  Tobacco  Allergies  Meds  Med Hx  Surg Hx  Fam Hx  Soc Hx      Reviewed and updated as needed this visit by Provider         ROS:  CONSTITUTIONAL: NEGATIVE for fever, chills, change in weight  ENT/MOUTH: NEGATIVE for ear, mouth and throat problems  RESP: NEGATIVE for significant cough or SOB  CV: NEGATIVE for chest pain, palpitations or peripheral edema  NEURO: NEGATIVE for weakness, dizziness or paresthesias, no tremor or tics  PSYCHIATRIC: NEGATIVE for changes in mood or affect    OBJECTIVE:     /80   Pulse 65   Temp 97.3  F (36.3  C) (Tympanic)   Resp 16   Ht 1.791 m (5' 10.5\")   Wt 84.6 kg (186 lb 6.4 oz)   SpO2 99%   BMI 26.37 kg/m    Body mass index is 26.37 kg/m .  GENERAL: healthy, alert " and no distress  CV: regular rate and rhythm, normal S1 S2, no S3 or S4, no murmur, click or rub, no peripheral edema and peripheral pulses strong  PSYCH: mentation appears normal, affect normal/bright    Diagnostic Test Results:  none     ASSESSMENT/PLAN:       Jona was seen today for a.d.h.d.    Diagnoses and all orders for this visit:    ADHD (attention deficit hyperactivity disorder), inattentive type: stable, refill  Plan urine at next clinic appt.  -     amphetamine-dextroamphetamine (ADDERALL XR) 20 MG 24 hr capsule; Take 2 capsules (40 mg) by mouth daily  -     amphetamine-dextroamphetamine (ADDERALL XR) 20 MG 24 hr capsule; Take 2 capsules (40 mg) by mouth daily  -     amphetamine-dextroamphetamine (ADDERALL XR) 20 MG 24 hr capsule; Take 2 capsules (40 mg) by mouth daily        Patient Instructions   Urine    Refills for 3 months, sooner if needed.      Mick Richter MD  [facility]

## 2019-01-16 NOTE — LETTER
[facility]  01/16/19    Patient: Jona Zafar  YOB: 1985  Medical Record Number: [id number]  CSN: [id number]                                                                              Non-opioid Controlled Substance Agreement    I understand that my care provider has prescribed a controlled substance to help manage my condition(s). I am taking this medicine to help me function or work. I know this is strong medicine, and that it can cause serious side effects. Controlled substances can be sedating, addicting and may cause a dependency on the drug. They can affect my ability to drive or think, and cause depression. They need to be taken exactly as prescribed. Combining controlled substances with certain medicines or chemicals (such as cocaine, sedatives and tranquilizers, sleeping pills, meth) can be dangerous or even fatal. Also, if I stop controlled substances suddenly, I may have severe withdrawal symptoms.  If not helpful, I may be asked to stop them.    The risks, benefits, and side effects of these medicine(s) were explained to me. I agree that:    1. I will take part in other treatments as advised by my care team. This may be psychiatry or counseling, physical therapy, behavioral therapy, group treatment or a referral to a pain clinic. I will reduce or stop my medicine when my care team tells me to do so.  2. I will take my medicines as prescribed. I will not change the dose or schedule unless my care team tells me to. There will be no refills if I  run out early.   I may be contactedwithout warning and asked to complete a urine drug test or pill count at any time.   3. I will keep all my appointments, and understand this is part of the monitoring of controlled substances. My care team may require an office visit for EVERY controlled substance refill. If I miss appointments or don t follow instructions, my care team may stop my medicine.  4. I will not ask other  providers to prescribe controlled substances, and I will not accept controlled substances from other people. If I need another prescribed controlled substance for a new reason, I will tell my care team within 1 business day.  5. I will use one pharmacy to fill all of my controlled substance prescriptions, and it is up to me to make sure that I do not run out of my medicines on weekends or holidays. If my care team is willing to refill my controlled substance prescription without a visit, I must request refills only during office hours, refills may take up to 3 days to process, and it may take up to 5 to 7 days for my medicine to be mailed and ready at my pharmacy. Prescriptions will not be mailed anywhere except my pharmacy.    6. I am responsible for my prescriptions. If the medicine/prescription is lost or stolen, it will not be replaced. I also agree not to share controlled substance medicines with anyone.              [facility]  01/16/19  Patient:  Jona Zafar  YOB: 1985  Medical Record Number: [id number]  CSN: [id number]    7. I agree to not use ANY illegal or recreational drugs. This includes marijuana, cocaine, bath salts or other drugs. I agree not to use alcohol unless my care team says I may. I agree to give urine samples whenever asked. If I don t give a urine sample, the care team may stop my medicine.    8. If I enroll in the [place] Medical Marijuana program, I will tell my care team. I will also sign an agreement to share my medical records with my care team.    9. I will bring in my list of medicines (or my medicine bottles) each time I come to the clinic.   10. I will tell my care team right away if I become pregnant or have a new medical problem treated outside of my regular clinic.  11. I understand that this medicine can affect my thinking and judgment. It may be unsafe for me to drive, use machinery and do dangerous tasks. I will not do any of these  things until I know how the medicine affects me. If my dose changes, I will wait to see how it affects me. I will contact my care team if I have concerns about medicine side effects.    I understand that if I do not follow any of the conditions above, my prescriptions or treatment may be stopped.      I agree that my provider, clinic care team, and pharmacy may work with any city, state or federal law enforcement agency that investigates the misuse, sale, or other diversion of my controlled medicine. I will allow my provider to discuss my care with or share a copy of this agreement with any other treating provider, pharmacy or emergency room where I receive care. I agree to give up (waive) any right of privacy or confidentiality with respect to these consents.   I have read this agreement and have asked questions about anything I did not understand.    ____________________________________________________    ____________  ________  Patient signature                                                         Date      Time    ____________________________________________________     ____________  ________  Witness                                                          Date      Time    ____________________________________________________  Provider signature

## 2019-01-17 ASSESSMENT — ANXIETY QUESTIONNAIRES: GAD7 TOTAL SCORE: 0

## 2019-05-03 ENCOUNTER — OFFICE VISIT (OUTPATIENT)
Dept: FAMILY MEDICINE | Facility: CLINIC | Age: 34
End: 2019-05-03
Payer: COMMERCIAL

## 2019-05-03 VITALS
OXYGEN SATURATION: 98 % | RESPIRATION RATE: 12 BRPM | HEART RATE: 72 BPM | WEIGHT: 190 LBS | TEMPERATURE: 97.4 F | SYSTOLIC BLOOD PRESSURE: 136 MMHG | DIASTOLIC BLOOD PRESSURE: 84 MMHG | HEIGHT: 70 IN | BODY MASS INDEX: 27.2 KG/M2

## 2019-05-03 DIAGNOSIS — F32.5 MAJOR DEPRESSIVE DISORDER WITH SINGLE EPISODE, IN FULL REMISSION (H): ICD-10-CM

## 2019-05-03 DIAGNOSIS — F90.0 ADHD (ATTENTION DEFICIT HYPERACTIVITY DISORDER), INATTENTIVE TYPE: Primary | ICD-10-CM

## 2019-05-03 PROCEDURE — 99213 OFFICE O/P EST LOW 20 MIN: CPT | Performed by: FAMILY MEDICINE

## 2019-05-03 RX ORDER — DEXTROAMPHETAMINE SACCHARATE, AMPHETAMINE ASPARTATE MONOHYDRATE, DEXTROAMPHETAMINE SULFATE AND AMPHETAMINE SULFATE 5; 5; 5; 5 MG/1; MG/1; MG/1; MG/1
40 CAPSULE, EXTENDED RELEASE ORAL DAILY
Qty: 60 CAPSULE | Refills: 0 | Status: SHIPPED | OUTPATIENT
Start: 2019-06-03 | End: 2019-07-03

## 2019-05-03 RX ORDER — DEXTROAMPHETAMINE SACCHARATE, AMPHETAMINE ASPARTATE MONOHYDRATE, DEXTROAMPHETAMINE SULFATE AND AMPHETAMINE SULFATE 5; 5; 5; 5 MG/1; MG/1; MG/1; MG/1
40 CAPSULE, EXTENDED RELEASE ORAL DAILY
Qty: 60 CAPSULE | Refills: 0 | Status: SHIPPED | OUTPATIENT
Start: 2019-07-04 | End: 2019-08-03

## 2019-05-03 RX ORDER — DEXTROAMPHETAMINE SACCHARATE, AMPHETAMINE ASPARTATE MONOHYDRATE, DEXTROAMPHETAMINE SULFATE AND AMPHETAMINE SULFATE 5; 5; 5; 5 MG/1; MG/1; MG/1; MG/1
40 CAPSULE, EXTENDED RELEASE ORAL DAILY
Qty: 60 CAPSULE | Refills: 0 | Status: SHIPPED | OUTPATIENT
Start: 2019-05-03 | End: 2019-06-02

## 2019-05-03 ASSESSMENT — MIFFLIN-ST. JEOR: SCORE: 1813.08

## 2019-05-03 NOTE — PROGRESS NOTES
"  SUBJECTIVE:   Jona Zafar is a 34 year old male who presents to clinic today for the following   health issues:  Chief Complaint   Patient presents with     A.D.H.D     Has been stable on 20mg BID since . 2018 switched to 40mg XR daily which has gone well.  Did go through ADHD eval 17 with Dr. Covington and diagnosed with ADHD and medication recommended.   Symptoms of ADHD around age 10 and diagnosed but only on medications for few weeks and dad didn't want him on medication.      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 Able to do paperwork and keep organized.  Has been out of medication two week so symptoms starting to get unorganized again, so needing to restart.    Did take medication last this morning.    Caffeine: 1-2 monster a day  Smokinppd trying to cut back  Alcohol: none  Drug: none         Additional history: as documented    Reviewed  and updated as needed this visit by clinical staff  Tobacco  Allergies  Meds  Med Hx  Surg Hx  Fam Hx  Soc Hx        Reviewed and updated as needed this visit by Provider         BP Readings from Last 3 Encounters:   19 136/84   19 130/80   10/16/18 130/82    Wt Readings from Last 3 Encounters:   19 86.2 kg (190 lb)   19 84.6 kg (186 lb 6.4 oz)   10/16/18 86.6 kg (191 lb)                    ROS:  CONSTITUTIONAL: NEGATIVE for fever, chills, change in weight  ENT/MOUTH: NEGATIVE for ear, mouth and throat problems  RESP: NEGATIVE for significant cough or SOB  CV: NEGATIVE for chest pain, palpitations or peripheral edema  NEURO: NEGATIVE for weakness, dizziness or paresthesias, no tremor or tics  PSYCHIATRIC: NEGATIVE for changes in mood or affect    OBJECTIVE:     /84   Pulse 72   Temp 97.4  F (36.3  C) (Tympanic)   Resp 12   Ht 1.77 m (5' 9.69\")   Wt 86.2 kg (190 lb)   SpO2 98%   BMI 27.51 kg/m    Body mass index is 27.51 kg/m .  GENERAL: healthy, alert and no distress  CV: regular rate and rhythm, normal S1 " S2, no S3 or S4, no murmur, click or rub, no peripheral edema and peripheral pulses strong  PSYCH: mentation appears normal, affect normal/bright    Diagnostic Test Results:  none     ASSESSMENT/PLAN:       Jona was seen today for a.d.h.d.    Diagnoses and all orders for this visit:    ADHD (attention deficit hyperactivity disorder), inattentive type: stable well controlled.  -     Drug Abuse Screen Panel 13, Urine (Pain Care Package)  -     amphetamine-dextroamphetamine (ADDERALL XR) 20 MG 24 hr capsule; Take 2 capsules (40 mg) by mouth daily  -     amphetamine-dextroamphetamine (ADDERALL XR) 20 MG 24 hr capsule; Take 2 capsules (40 mg) by mouth daily  -     amphetamine-dextroamphetamine (ADDERALL XR) 20 MG 24 hr capsule; Take 2 capsules (40 mg) by mouth daily    Major depressive disorder with single episode, in full remission (H): stable, in remission, well controlled now that ADHD well controlled.        Patient Instructions   Urine sample today    Next clinic follow up in 3 months.      Mick Richter MD  [facility]

## 2019-07-02 DIAGNOSIS — F90.0 ADHD (ATTENTION DEFICIT HYPERACTIVITY DISORDER), INATTENTIVE TYPE: ICD-10-CM

## 2019-07-02 NOTE — TELEPHONE ENCOUNTER
Requested Prescriptions   Pending Prescriptions Disp Refills     amphetamine-dextroamphetamine (ADDERALL XR) 20 MG 24 hr capsule 60 capsule 0     Sig: Take 2 capsules (40 mg) by mouth daily       amphetamine-dextroamphetamine (ADDERALL XR) 20 MG 24 hr capsule  Last Written Prescription Date:  6/3/19  Last Fill Quantity: 60 cap,   # refills: 0  Last Office Visit: 5/3/19 Mick Richter    Future Office visit:       Routing refill request to provider for review/approval because:  Drug not on the G, P or [facility] refill protocol or controlled substance         There is no refill protocol information for this order

## 2019-07-05 RX ORDER — DEXTROAMPHETAMINE SACCHARATE, AMPHETAMINE ASPARTATE MONOHYDRATE, DEXTROAMPHETAMINE SULFATE AND AMPHETAMINE SULFATE 5; 5; 5; 5 MG/1; MG/1; MG/1; MG/1
40 CAPSULE, EXTENDED RELEASE ORAL DAILY
Qty: 60 CAPSULE | Refills: 0 | OUTPATIENT
Start: 2019-07-05

## 2019-09-13 ENCOUNTER — HOSPITAL ENCOUNTER (EMERGENCY)
Facility: CLINIC | Age: 34
Discharge: HOME | End: 2019-09-13
Attending: STUDENT IN AN ORGANIZED HEALTH CARE EDUCATION/TRAINING PROGRAM | Admitting: STUDENT IN AN ORGANIZED HEALTH CARE EDUCATION/TRAINING PROGRAM
Payer: COMMERCIAL

## 2019-09-13 VITALS
HEART RATE: 59 BPM | HEIGHT: 70 IN | TEMPERATURE: 98 F | BODY MASS INDEX: 26.48 KG/M2 | DIASTOLIC BLOOD PRESSURE: 106 MMHG | WEIGHT: 185 LBS | OXYGEN SATURATION: 98 % | SYSTOLIC BLOOD PRESSURE: 148 MMHG | RESPIRATION RATE: 18 BRPM

## 2019-09-13 DIAGNOSIS — K08.89 PAIN, DENTAL: ICD-10-CM

## 2019-09-13 PROCEDURE — 25000132 ZZH RX MED GY IP 250 OP 250 PS 637: Performed by: STUDENT IN AN ORGANIZED HEALTH CARE EDUCATION/TRAINING PROGRAM

## 2019-09-13 PROCEDURE — 99284 EMERGENCY DEPT VISIT MOD MDM: CPT | Mod: 25 | Performed by: STUDENT IN AN ORGANIZED HEALTH CARE EDUCATION/TRAINING PROGRAM

## 2019-09-13 PROCEDURE — 64400 NJX AA&/STRD TRIGEMINAL NRV: CPT | Mod: Z6 | Performed by: STUDENT IN AN ORGANIZED HEALTH CARE EDUCATION/TRAINING PROGRAM

## 2019-09-13 PROCEDURE — 64400 NJX AA&/STRD TRIGEMINAL NRV: CPT | Performed by: STUDENT IN AN ORGANIZED HEALTH CARE EDUCATION/TRAINING PROGRAM

## 2019-09-13 PROCEDURE — 25000128 H RX IP 250 OP 636: Performed by: STUDENT IN AN ORGANIZED HEALTH CARE EDUCATION/TRAINING PROGRAM

## 2019-09-13 PROCEDURE — 99283 EMERGENCY DEPT VISIT LOW MDM: CPT | Mod: 25 | Performed by: STUDENT IN AN ORGANIZED HEALTH CARE EDUCATION/TRAINING PROGRAM

## 2019-09-13 RX ORDER — OXYCODONE HYDROCHLORIDE 5 MG/1
5 TABLET ORAL ONCE
Status: COMPLETED | OUTPATIENT
Start: 2019-09-13 | End: 2019-09-13

## 2019-09-13 RX ORDER — PENICILLIN V POTASSIUM 250 MG/1
500 TABLET, FILM COATED ORAL ONCE
Status: COMPLETED | OUTPATIENT
Start: 2019-09-13 | End: 2019-09-13

## 2019-09-13 RX ORDER — PENICILLIN V POTASSIUM 500 MG/1
500 TABLET, FILM COATED ORAL 4 TIMES DAILY
Qty: 40 TABLET | Refills: 0 | Status: SHIPPED | OUTPATIENT
Start: 2019-09-13 | End: 2019-09-13

## 2019-09-13 RX ORDER — PENICILLIN V POTASSIUM 500 MG/1
500 TABLET, FILM COATED ORAL 4 TIMES DAILY
Qty: 40 TABLET | Refills: 0 | Status: SHIPPED | OUTPATIENT
Start: 2019-09-13

## 2019-09-13 RX ORDER — BUPIVACAINE HYDROCHLORIDE 5 MG/ML
1 INJECTION, SOLUTION PERINEURAL ONCE
Status: COMPLETED | OUTPATIENT
Start: 2019-09-13 | End: 2019-09-13

## 2019-09-13 RX ORDER — OXYCODONE HYDROCHLORIDE 5 MG/1
5 TABLET ORAL EVERY 6 HOURS PRN
Qty: 8 TABLET | Refills: 0 | Status: SHIPPED | OUTPATIENT
Start: 2019-09-13

## 2019-09-13 RX ADMIN — PENICILLIN V POTASSIUM 500 MG: 250 TABLET, FILM COATED ORAL at 02:33

## 2019-09-13 RX ADMIN — OXYCODONE HYDROCHLORIDE 5 MG: 5 TABLET ORAL at 02:33

## 2019-09-13 RX ADMIN — BUPIVACAINE HYDROCHLORIDE 5 MG: 5 INJECTION, SOLUTION PERINEURAL at 02:24

## 2019-09-13 ASSESSMENT — MIFFLIN-ST. JEOR: SCORE: 1795.4

## 2019-09-13 NOTE — ED AVS SNAPSHOT
[facility] Emergency Department  [address] [address]  Phone:  [phone]  Fax:  [phone]                                    Jona aZfar   MRN: [id number]    Department:  [facility] Emergency Department   Date of Visit:  9/13/2019           After Visit Summary Signature Page    I have received my discharge instructions, and my questions have been answered. I have discussed any challenges I see with this plan with the nurse or doctor.    ..........................................................................................................................................  Patient/Patient Representative Signature      ..........................................................................................................................................  Patient Representative Print Name and Relationship to Patient    ..................................................               ................................................  Date                                   Time    ..........................................................................................................................................  Reviewed by Signature/Title    ...................................................              ..............................................  Date                                               Time          22EPIC Rev 08/18

## 2019-09-13 NOTE — ED PROVIDER NOTES
History     Chief Complaint   Patient presents with     Dental Pain     HPI  Jona Zafar is a 34 year old male who resents for evaluation of left mandibular dental pain.  Patient states that over the past 24 hours she has developed left mandibular dental pain and mild swelling.  He is aware of having poor dentition, had right mandibular pain 2-3 months ago for which he was seen by local dentist and prescribed an oral antibiotic.  He plans to schedule with dentistry again in the near future to have all of his teeth pulled, he says.  He denies fever, chills, headache, sore throat, neck stiffness, difficulty swallowing, earache, or other concerns.  Ibuprofen was taken to arrival but without relief from pain.    Allergies:  No Known Allergies    Problem List:    Patient Active Problem List    Diagnosis Date Noted     ADHD (attention deficit hyperactivity disorder), inattentive type 02/15/2017     Priority: Medium     Patient is followed by ALHAJI MCKENZIE for ongoing prescription of stimulants.  All refills should be approved by this provider, or covering partner.    Medication(s): Adderal 40mg (2 20mg) XR   Maximum quantity per month: #60  Clinic visit frequency required: Q 3 months     Controlled substance agreement on file: Yes       Date(s): 4/10/18  Neuropsych evaluation for ADD completed:  Yes, completed 2/2017, on file and diagnosis confirmed    Last [facility] website verification:  done on 4/10/18   [email or web address]           Major depressive disorder with single episode, in full remission (H) 04/07/2016     Priority: Medium        Past Medical History:    No past medical history on file.    Past Surgical History:    No past surgical history on file.    Family History:    Family History   Problem Relation Age of Onset     Breast Cancer Mother         reoccurance Oct. 2018     LUNG DISEASE Maternal Grandfather      Myocardial Infarction Paternal Grandmother      Prostate Cancer Paternal  "Grandfather        Social History:  Marital Status:  Single [1]  Social History     Tobacco Use     Smoking status: Former Smoker     Packs/day: 1.00     Types: Cigarettes     Smokeless tobacco: Former User   Substance Use Topics     Alcohol use: No     Drug use: No        Medications:      oxyCODONE (ROXICODONE) 5 MG tablet   penicillin V (VEETID) 500 MG tablet         Review of Systems  Constitutional:  Negative for fever or chills.  HENT: Positive for left mandibular dental pain.  Negative for sore throat or difficulty swallowing.  Respiratory:  Negative for cough or shortness of breath.  Musculoskeletal:  Negative for neck pain or stiffness  Neurological:  Negative for headache.    All others reviewed and are negative.      Physical Exam   BP: (!) 148/106  Pulse: 59  Temp: 98  F (36.7  C)  Resp: 18  Height: 177.8 cm (5' 10\")  Weight: 83.9 kg (185 lb)  SpO2: 98 %      Physical Exam  Constitutional:  Well developed, well nourished.  Appears nontoxic and in no acute distress.   HENT:  Normocephalic and atraumatic.  Eyes:  Conjunctivae are normal.  Oral:  Patient is without trismus.  Moist oral mucosa.  Significant dental decay of multiple teeth, in particular the mandibular molars bilaterally.  Tenderness of left first mandibular molar.  Gingival lining intact without abscess or ulcerative gingivitis.  No pharyngeal erythema or exudate.  No uvular asymmetry.  Patient is able to elevate tongue without sublingual swelling.  Voice is not muffled.  Tolerates secretions.  Neck:  Neck supple.  Cardiovascular:  No cyanosis.   Respiratory:  Effort normal, no respiratory distress.   Musculoskeletal:  Moves extremities spontaneously.  Neurological:  Patient is alert.  Skin:  Skin is warm and dry.  Psychiatric:  Normal mood and affect.      ED Course        Procedures               Critical Care time:  none               No results found for this or any previous visit (from the past 24 hour(s)).    Medications   penicillin V " (VEETID) tablet 500 mg (has no administration in time range)   oxyCODONE (ROXICODONE) tablet 5 mg (has no administration in time range)   BUPivacaine (MARCAINE) 0.5%  injection (5 mg Dental Given by Other 9/13/19 7701)       Assessments & Plan (with Medical Decision Making)   Jona Zafar is a 34 year old male who presents to the department for evaluation of progressive left mid to lower dental pain refractory to OTC ibuprofen.  He does not have signs of ANUG, peritonsillar abscess, Chetan's angina, osteomyelitis, orofacial or peripharyngeal deep space infection.  There is some subtle swelling and symptoms could be representative of periapical abscess.  He will be started on oral antibiotic therapy via penicillin, consented to inferior alveolar nerve block, and will be prescribed a very short course of oxycodone to take only as absolutely necessary for breakthrough pain.  Have encouraged him to follow-up with dentistry clinic over the next 2-3 days for reevaluation and definitive management plan.  He agrees with discharge plan including return instructions for developing symptoms or other concerns.        Disclaimer:  This note consists of symbols derived from keyboarding, dictation, and/or voice recognition software.  As a result, there may be errors in the script that have gone undetected.  Please consider this when interpreting information found in the chart.        I have reviewed the nursing notes.    I have reviewed the findings, diagnosis, plan and need for follow up with the patient.       New Prescriptions    OXYCODONE (ROXICODONE) 5 MG TABLET    Take 1 tablet (5 mg) by mouth every 6 hours as needed for severe pain    PENICILLIN V (VEETID) 500 MG TABLET    Take 1 tablet (500 mg) by mouth 4 times daily       Final diagnoses:   Pain, dental       9/13/2019   [facility] EMERGENCY DEPARTMENT     Abelardo Stearns, DO  09/13/19 [id number]

## 2019-09-13 NOTE — ED TRIAGE NOTES
Pt presents with left lower dental pain and swelling. Pain began 24 hours ago. Pt states he has a long history of dental problems and is trying to go in to get all teeth pulled and has dental insurance.

## 2022-02-10 ENCOUNTER — HOSPITAL ENCOUNTER (EMERGENCY)
Facility: HOSPITAL | Age: 37
Discharge: HOME | End: 2022-02-10
Attending: EMERGENCY MEDICINE | Admitting: EMERGENCY MEDICINE
Payer: COMMERCIAL

## 2022-02-10 VITALS
DIASTOLIC BLOOD PRESSURE: 85 MMHG | HEIGHT: 70 IN | BODY MASS INDEX: 26.48 KG/M2 | SYSTOLIC BLOOD PRESSURE: 153 MMHG | RESPIRATION RATE: 16 BRPM | WEIGHT: 185 LBS | HEART RATE: 66 BPM | TEMPERATURE: 97.8 F | OXYGEN SATURATION: 99 %

## 2022-02-10 DIAGNOSIS — S05.02XA ABRASION OF LEFT CORNEA, INITIAL ENCOUNTER: ICD-10-CM

## 2022-02-10 PROCEDURE — 99282 EMERGENCY DEPT VISIT SF MDM: CPT

## 2022-02-10 RX ORDER — ERYTHROMYCIN 5 MG/G
0.5 OINTMENT OPHTHALMIC 4 TIMES DAILY
Qty: 3.5 G | Refills: 0 | Status: SHIPPED | OUTPATIENT
Start: 2022-02-10 | End: 2022-02-17

## 2022-02-10 RX ORDER — ERYTHROMYCIN 5 MG/G
OINTMENT OPHTHALMIC ONCE
Status: DISCONTINUED | OUTPATIENT
Start: 2022-02-10 | End: 2022-02-10 | Stop reason: HOSPADM

## 2022-02-10 RX ORDER — TETRACAINE HYDROCHLORIDE 5 MG/ML
2 SOLUTION OPHTHALMIC ONCE
Status: DISCONTINUED | OUTPATIENT
Start: 2022-02-10 | End: 2022-02-10 | Stop reason: HOSPADM

## 2022-02-10 ASSESSMENT — ENCOUNTER SYMPTOMS: EYE PAIN: 1

## 2022-02-10 ASSESSMENT — MIFFLIN-ST. JEOR: SCORE: 1785.4

## 2022-02-10 NOTE — ED NOTES
Pt left without notice before ointment applied.  Provider notified.  Provider comfirms pt has RX with him.

## 2022-02-10 NOTE — ED TRIAGE NOTES
Pt was cutting down a tree when the wind blew and saw dust sent into his left eye.  He has not tried to flush it. Happened a few hours ago.

## 2022-02-10 NOTE — ED PROVIDER NOTES
EMERGENCY DEPARTMENT ENCOUNTER     NAME: Jona Zafar   AGE: 36 year old male   YOB: 1985   MRN: [id number]   EVALUATION DATE & TIME: No admission date for patient encounter.   PCP: Mick Richter     Chief Complaint   Patient presents with     Eye Injury   :    FINAL IMPRESSION       1. Abrasion of left cornea, initial encounter           ED COURSE & MEDICAL DECISION MAKING    3:26 PM I performed my initial history and physical exam as well as discussed ED course and plan. Will perform a woods lamp exam on the patient   3:56 PM I performed a woods lamp exam and found the patient to have a linear corneal abrasion. I updated the patient on treatment following discharge. Will send patient home at this time.      Pertinent Labs & Imaging studies reviewed. (See chart for details)   36 year old male  presents to the Emergency Department for evaluation of an eye injury. Initial Vitals Reviewed. Initial exam notable for generally well-appearing patient who has left eye conjunctival injection and tearing, blepharospasm.  Lid was everted and swept and there is no foreign body still present that I can visualize at this time.  Under fluorescein staining, I can see a linear corneal abrasion rate at the 3 o'clock position over the iris, and I suspect this is what caused the symptoms.  We have started treatment with erythromycin, and I will have him follow-up closely with [facility].  He is comfortable with discharge at this time.         At the conclusion of the encounter I discussed the results of all of the tests and the disposition. The questions were answered. The patient or family acknowledged understanding and was agreeable with the care plan.         MEDICATIONS GIVEN IN THE EMERGENCY:   Medications   tetracaine (PONTOCAINE) 0.5 % ophthalmic solution 2 drop (has no administration in time range)   fluorescein (FUL-JUVENTINO) ophthalmic strip 1 strip (has no administration in time range)   erythromycin  "(ROMYCIN) ophthalmic ointment (has no administration in time range)      NEW PRESCRIPTIONS STARTED AT TODAY'S ER VISIT   New Prescriptions    ERYTHROMYCIN (ROMYCIN) 5 MG/GM OPHTHALMIC OINTMENT    Place 0.5 inches Into the left eye 4 times daily for 7 days     ================================================================   HISTORY OF PRESENT ILLNESS       Patient information was obtained from: Patient    Use of Intrepreter: N/A   Jona Zafar is a 36 year old male with no pertinent history who presents via private car for evaluation of an eye injury.     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 He notes that his eye pain is worsened every time he turns his eye in a different direction. Patient describes the sensation as \"something is stuck underneath my eyelid\". Denies any vision loss. Patient does not wear contacts or glasses. No other complaints at this time.     ================================================================    REVIEW OF SYSTEMS       Review of Systems   Eyes: Positive for pain (worsened when he moves his eye). Negative for visual disturbance.   All other systems reviewed and are negative.      PAST HISTORY     PAST MEDICAL HISTORY:   No past medical history on file.   PAST SURGICAL HISTORY:   No past surgical history on file.   CURRENT MEDICATIONS:   erythromycin (ROMYCIN) 5 MG/GM ophthalmic ointment  oxyCODONE (ROXICODONE) 5 MG tablet  penicillin V (VEETID) 500 MG tablet      ALLERGIES:   No Known Allergies   FAMILY HISTORY:   Family History   Problem Relation Age of Onset     Breast Cancer Mother         reoccurance Oct. 2018     LUNG DISEASE Maternal Grandfather      Myocardial Infarction Paternal Grandmother      Prostate Cancer Paternal Grandfather       SOCIAL HISTORY:   Social History     Socioeconomic History     Marital status: Single     Spouse name: Not on file     Number of children: Not on file     " "Years of education: Not on file     Highest education level: Not on file   Occupational History     Not on file   Tobacco Use     Smoking status: Former Smoker     Packs/day: 1.00     Types: Cigarettes     Smokeless tobacco: Former User   Substance and Sexual Activity     Alcohol use: No     Drug use: No     Sexual activity: Yes     Partners: Female     Birth control/protection: None   Other Topics Concern     Parent/sibling w/ CABG, MI or angioplasty before 65F 55M? No   Social History Narrative     Not on file     Social Determinants of Health     Financial Resource Strain: Not on file   Food Insecurity: Not on file   Transportation Needs: Not on file   Physical Activity: Not on file   Stress: Not on file   Social Connections: Not on file   Intimate Partner Violence: Not on file   Housing Stability: Not on file        VITALS  Patient Vitals for the past 24 hrs:   BP Temp Temp src Pulse Resp SpO2 Height Weight   02/10/22 1414 (!) 153/85 97.8  F (36.6  C) Oral 66 16 99 % 1.778 m (5' 10\") 83.9 kg (185 lb)        ================================================================    PHYSICAL EXAM     VITAL SIGNS: BP (!) 153/85   Pulse 66   Temp 97.8  F (36.6  C) (Oral)   Resp 16   Ht 1.778 m (5' 10\")   Wt 83.9 kg (185 lb)   SpO2 99%   BMI 26.54 kg/m     Constitutional:  Awake, no acute distress   HENT:  Atraumatic, oropharynx without exudate or erythema, membranes moist  Lymph:  No adenopathy  Eyes: EOM intact, PERRL, Blepharal spasm, conjunctival injection and tearing, no foreign body, linear corneal abrasion at the 3 o'clock position  Neck: Supple  Respiratory:  Clear to auscultation bilaterally, no wheezes or crackles   Cardiovascular:  Regular rate and rhythm, single S1 and S2   GI:  Soft, nontender, nondistended, no rebound or guarding   Musculoskeletal:  Moves all extremities, no lower extremity edema, no deformities    Skin:  Warm, dry  Neurologic:  Alert and oriented x3, no focal deficits noted "       ================================================================  LAB       All pertinent labs reviewed and interpreted.   Labs Ordered and Resulted from Time of ED Arrival to Time of ED Departure - No data to display     ===============================================================  RADIOLOGY       Reviewed all pertinent imaging. Please see official radiology report.   No orders to display         ================================================================  EKG         I have independently reviewed and interpreted the EKG(s) documented above.     ================================================================  PROCEDURES     PROCEDURE: Woods lamp Exam   INDICATIONS: Eye pain   PROCEDURE PROVIDER: Dr Geno Morgan   SITE: left eye   CONSENT: The risks, benefits and alternatives for this procedure were explained to the patient and verbally accepted.     MEDICATION: fluorescein stain and tetracaine   EXAM FINDINGS: Right Eye: clear  Left Eye: corneal abrasion 3:00 position   COMPLICATIONS: Patient tolerated procedure well, without complication           I, Perez Bell, am serving as a scribe to document services personally performed by Dr. Dominguez based on my observation and the provider's statements to me. I, Geno Dominguez MD attest that Perez Bell is acting in a scribe capacity, has observed my performance of the services and has documented them in accordance with my direction.   Geno Dominguez M.D.   Emergency Medicine   [facility] EMERGENCY DEPARTMENT  [address] [place], [place] [id number]  [phone]  Dept: [phone]        Geno Dominguez MD  02/10/22 7490

## 2022-02-10 NOTE — ED PROVIDER NOTES
"ED Provider In Triage Note  [facility]  Encounter Date: Feb 10, 2022    Chief Complaint   Patient presents with     Eye Injury       Brief HPI:   Jona Zafar is a 36 year old male presenting to the Emergency Department with a chief complaint of \"sawdust in left eye\". 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 Happened a few hours ago    Brief Physical Exam:  There were no vitals taken for this visit.  General: Non-toxic appearing  HEENT: Atraumatic  Resp: No respiratory distress  Abdomen: Non-peritoneal  Neuro: Alert, oriented, answers questions appropriately  Psych: Behavior appropriate      Plan Initiated in Triage:  Orders Placed This Encounter     tetracaine (PONTOCAINE) 0.5 % ophthalmic solution 2 drop       PIT Dispo:   RP    Mike Root MD on 2/10/2022 at 2:13 PM    Patient was evaluated by the Physician in Triage due to a limitation of available rooms in the Emergency Department. A plan of care was discussed based on the information obtained on the initial evaluation and patient was consuled to return back to the Emergency Department lobby after this initial evalutaiton until results were obtained or a room became available in the Emergency Department. Patient was counseled not to leave prior to receiving the results of their workup.      Mike Root MD  02/10/22 1414    "

## 2023-02-18 ENCOUNTER — HOSPITAL ENCOUNTER (EMERGENCY)
Facility: CLINIC | Age: 38
Discharge: HOME | End: 2023-02-18
Attending: PHYSICIAN ASSISTANT | Admitting: PHYSICIAN ASSISTANT
Payer: COMMERCIAL

## 2023-02-18 VITALS
SYSTOLIC BLOOD PRESSURE: 134 MMHG | HEIGHT: 70 IN | RESPIRATION RATE: 18 BRPM | DIASTOLIC BLOOD PRESSURE: 83 MMHG | BODY MASS INDEX: 26.54 KG/M2 | TEMPERATURE: 96.9 F | OXYGEN SATURATION: 100 % | HEART RATE: 67 BPM

## 2023-02-18 DIAGNOSIS — R07.0 THROAT PAIN: ICD-10-CM

## 2023-02-18 LAB
DEPRECATED S PYO AG THROAT QL EIA: NEGATIVE
GROUP A STREP BY PCR: NOT DETECTED

## 2023-02-18 PROCEDURE — 99213 OFFICE O/P EST LOW 20 MIN: CPT | Performed by: PHYSICIAN ASSISTANT

## 2023-02-18 PROCEDURE — G0463 HOSPITAL OUTPT CLINIC VISIT: HCPCS | Performed by: PHYSICIAN ASSISTANT

## 2023-02-18 PROCEDURE — 87651 STREP A DNA AMP PROBE: CPT | Performed by: PHYSICIAN ASSISTANT

## 2023-02-18 ASSESSMENT — ENCOUNTER SYMPTOMS
RHINORRHEA: 1
GASTROINTESTINAL NEGATIVE: 1
COUGH: 1
CONSTITUTIONAL NEGATIVE: 1
CARDIOVASCULAR NEGATIVE: 1
SORE THROAT: 1
MUSCULOSKELETAL NEGATIVE: 1
HEADACHES: 1

## 2023-02-18 ASSESSMENT — ACTIVITIES OF DAILY LIVING (ADL): ADLS_ACUITY_SCORE: 35

## 2023-02-18 NOTE — DISCHARGE INSTRUCTIONS
No antibiotics indicated at this time.   Throat culture sent and currently pending.  Rapid strep today was negative.  Recommend COVID testing at home if symptoms persist    Symptomatic treatment with fluids, rest, salt water gargles, and cool humidifier.  May use acetaminophen, ibuprofen as needed.     Return to care if any worsening symptoms or if not improving ([place] may need to be ruled out if symptoms fail to improve).    Patient to go to Emergency Room if drooling, change in voice, difficulty swallowing or talking, or persistent fevers occur.      Patient voiced understanding of instructions given.

## 2023-02-18 NOTE — ED PROVIDER NOTES
History     Chief Complaint   Patient presents with     Pharyngitis     Hx of strept started last night     HPI  Jona Zafar  is a 37 year old male who is here today because of: Sore Throat.  The patient has had symptoms of sore throat and nasal congestion/runny nose.   Onset of symptoms was yesterday. Course of illness is same.  Patient possibly exposured to illness at home or work/school.  Patient denies fever, earache, nausea, vomiting, diarrhea and headache    Problem list, Medication list, Allergies, and Medical/Social/Surgical histories reviewed in [facility] and updated as appropriate.    Allergies:  No Known Allergies    Problem List:    Patient Active Problem List    Diagnosis Date Noted     ADHD (attention deficit hyperactivity disorder), inattentive type 02/15/2017     Priority: Medium     Patient is followed by ALHAJI MCKENZIE for ongoing prescription of stimulants.  All refills should be approved by this provider, or covering partner.    Medication(s): Adderal 40mg (2 20mg) XR   Maximum quantity per month: #60  Clinic visit frequency required: Q 3 months     Controlled substance agreement on file: Yes       Date(s): 4/10/18  Neuropsych evaluation for ADD completed:  Yes, completed 2/2017, on file and diagnosis confirmed    Last [facility] website verification:  done on 4/10/18   [email or web address]           Major depressive disorder with single episode, in full remission (H) 04/07/2016     Priority: Medium        Past Medical History:    No past medical history on file.    Past Surgical History:    No past surgical history on file.    Family History:    Family History   Problem Relation Age of Onset     Breast Cancer Mother         reoccurance Oct. 2018     LUNG DISEASE Maternal Grandfather      Myocardial Infarction Paternal Grandmother      Prostate Cancer Paternal Grandfather        Social History:  Marital Status:  Single [1]  Social History     Tobacco Use     Smoking status: Former      "Packs/day: 1.00     Types: Cigarettes     Smokeless tobacco: Former   Substance Use Topics     Alcohol use: No     Drug use: No        Medications:    oxyCODONE (ROXICODONE) 5 MG tablet  penicillin V (VEETID) 500 MG tablet          Review of Systems   Constitutional: Negative.    HENT: Positive for congestion, rhinorrhea and sore throat.    Respiratory: Positive for cough (clearing throat).    Cardiovascular: Negative.    Gastrointestinal: Negative.    Musculoskeletal: Negative.    Skin: Negative.    Neurological: Positive for headaches.   All other systems reviewed and are negative.      Physical Exam   BP: 134/83  Pulse: 67  Temp: 96.9  F (36.1  C)  Resp: 18  Height: 177.8 cm (5' 10\")  SpO2: 100 %      Physical Exam     /83   Pulse 67   Temp 96.9  F (36.1  C) (Tympanic)   Resp 18   Ht 1.778 m (5' 10\")   SpO2 100%   BMI 26.54 kg/m    General: healthy, alert with no acute distress, and non toxic in appearance  Eyes - conjunctivae clear.  Ears - External ears normal. Canals clear. TM's normal.  Nose/Sinuses - Nares normal.Mucosa normal. No drainage or sinus tenderness.  Oropharynx - Lips, mucosa, and tongue normal. Positive findings: oropharyngeal erythema, no tonsillar hypertrophy or exudates present, uvula midline, no dysphonia, dysphagia, or trismus.   Neck - Neck supple; Positive findings: few anterior cervical nodes, no meningeal signs.   Lungs - Lungs clear; no wheezing or rales.  Heart - regular rate and rhythm. No murmurs, rub.  SKIN: no suspicious lesions or rashes      ED Course                 Procedures             Critical Care time:  none               Results for orders placed or performed during the hospital encounter of 02/18/23 (from the past 24 hour(s))   Streptococcus A Rapid Scr w Reflx to PCR    Specimen: Throat; Swab   Result Value Ref Range    Group A Strep antigen Negative Negative       Medications - No data to display    Assessments & Plan (with Medical Decision Making)     I " have reviewed the nursing notes.    I have reviewed the findings, diagnosis, plan and need for follow up with the patient.    Jona Zafar  is a 37 year old male who is here today because of: Sore Throat.  The patient has had symptoms of sore throat and nasal congestion/runny nose.   Onset of symptoms was yesterday. Course of illness is same.  Patient possibly exposured to illness at home or work/school.  Patient denies fever, earache, nausea, vomiting, diarrhea and headache    Rapid strep today was negative.  Vitals within normal limits.  We will wait for PCR of throat.  Nidia in writing commend COVID testing at home.  Symptomatic treatments discussed and patient discharged in stable condition with no concerns for Chetan's angina or peritonsillar abscess.    Discharge Medication List as of 2/18/2023 11:14 AM          Final diagnoses:   Throat pain       2/18/2023   [facility] EMERGENCY DEPT

## 2023-08-07 ENCOUNTER — HOSPITAL ENCOUNTER (EMERGENCY)
Facility: CLINIC | Age: 38
Discharge: LEFT BEFORE DISCHARGE | End: 2023-08-07
Admitting: EMERGENCY MEDICINE
Payer: COMMERCIAL

## 2023-08-07 LAB — GROUP A STREP BY PCR: NOT DETECTED

## 2023-08-07 PROCEDURE — 87651 STREP A DNA AMP PROBE: CPT | Performed by: EMERGENCY MEDICINE

## 2023-08-07 PROCEDURE — 99281 EMR DPT VST MAYX REQ PHY/QHP: CPT | Performed by: EMERGENCY MEDICINE

## 2023-08-08 ENCOUNTER — PATIENT OUTREACH (OUTPATIENT)
Dept: FAMILY MEDICINE | Facility: CLINIC | Age: 38
End: 2023-08-08
Payer: COMMERCIAL

## 2023-08-08 NOTE — TELEPHONE ENCOUNTER
"ED/Discharge Protocol    \"Hi, my name is SIRENA SINGH, RN, a registered nurse, and I am calling on behalf of Dr. Richter's office at [place].  I am calling to follow up and see how things are going for you after your recent visit.\"    \"I see that you were in the (ER/UC/IP) on 8/7/23 for sore throat.    How are you doing now that you are home?\" Patient states his throat is a bit better.  He had presented to ER, then left due to long wait time, but did have a negative strep test, which is mainly what he was concerned about.      Is patient experiencing symptoms that may require a hospital visit?  No    Discharge Instructions    \"Let's review your discharge instructions.  What is/are the follow-up recommendations?  Pt. Response: NA-not technically seen by a provider.     \"Were you instructed to make a follow-up appointment?\"  Pt. Response: No, and he declines clinic appointment at this time.     \"When you see the provider, I would recommend that you bring your discharge instructions with you.    Medications    \"How many new medications are you on since your hospitalization/ED visit?\"    0  \"How many of your current medicines changed (dose, timing, name, etc.) while you were in the hospital/ED visit?\"   0  \"Do you have questions about your medications?\"   NA  \"Were you newly diagnosed with heart failure, COPD, diabetes or did you have a heart attack?\"   No  For patients on insulin: \"Did you start on insulin in the hospital or did you have your insulin dose changed?\"   No  Post Discharge Medication Reconciliation Status: discharge medications reconciled, continue medications without change.    Was MTM referral placed (*Make sure to put transitions as reason for referral)?   No    Call Summary    \"Do you have any questions or concerns about your condition or care plan at the moment?\"    No  Triage nurse advice given: Continue home cares, f/u in clinic as needed for persistent or worsening symptoms.     Patient was in ER " "2x in the past year (assess appropriateness of ER visits.)      \"If you have questions or things don't continue to improve, we encourage you contact us through the main clinic number,  [phone].  Even if the clinic is not open, triage nurses are available 24/7 to help you.     We would like you to know that our clinic has extended hours (provide information).  We also have urgent care (provide details on closest location and hours/contact info)\"      \"Thank you for your time and take care!\"    Cristine Singleton RN  [facility]    "

## 2023-08-08 NOTE — ED TRIAGE NOTES
Presents with sore throat, fever, fatigue since Saturday, . Max tpr 102.    Triage Assessment       Row Name 08/07/23 1939       Triage Assessment (Adult)    Airway WDL WDL       Respiratory WDL    Respiratory WDL WDL       Skin Circulation/Temperature WDL    Skin Circulation/Temperature WDL WDL       Cardiac WDL    Cardiac WDL WDL       Peripheral/Neurovascular WDL    Peripheral Neurovascular WDL WDL       Cognitive/Neuro/Behavioral WDL    Cognitive/Neuro/Behavioral WDL WDL

## 2024-01-01 ENCOUNTER — HOSPITAL ENCOUNTER (EMERGENCY)
Facility: CLINIC | Age: 39
Discharge: HOME | End: 2024-01-01
Attending: FAMILY MEDICINE | Admitting: FAMILY MEDICINE
Payer: COMMERCIAL

## 2024-01-01 VITALS
TEMPERATURE: 97 F | HEART RATE: 61 BPM | RESPIRATION RATE: 16 BRPM | SYSTOLIC BLOOD PRESSURE: 146 MMHG | WEIGHT: 200 LBS | BODY MASS INDEX: 28.7 KG/M2 | OXYGEN SATURATION: 98 % | DIASTOLIC BLOOD PRESSURE: 89 MMHG

## 2024-01-01 DIAGNOSIS — J10.1 INFLUENZA A: ICD-10-CM

## 2024-01-01 LAB
FLUAV RNA SPEC QL NAA+PROBE: POSITIVE
FLUBV RNA RESP QL NAA+PROBE: NEGATIVE
RSV RNA SPEC NAA+PROBE: NEGATIVE
SARS-COV-2 RNA RESP QL NAA+PROBE: NEGATIVE

## 2024-01-01 PROCEDURE — 99283 EMERGENCY DEPT VISIT LOW MDM: CPT | Performed by: FAMILY MEDICINE

## 2024-01-01 PROCEDURE — 87637 SARSCOV2&INF A&B&RSV AMP PRB: CPT | Performed by: FAMILY MEDICINE

## 2024-01-01 NOTE — ED TRIAGE NOTES
Cough since yesterday, burning in chest and stomach     Triage Assessment (Adult)       Row Name 01/01/24 1112          Triage Assessment    Airway WDL WDL        Respiratory WDL    Respiratory WDL cough        Peripheral/Neurovascular WDL    Peripheral Neurovascular WDL WDL        Cognitive/Neuro/Behavioral WDL    Cognitive/Neuro/Behavioral WDL WDL

## 2024-01-01 NOTE — DISCHARGE INSTRUCTIONS
RETURN TO THE EMERGENCY ROOM FOR THE FOLLOWING:    Severely worsened breathing, vomiting and dehydration, fainting, or at anytime for any concern.    FOLLOW UP:    With your primary clinic only as needed.    TREATMENT RECOMMENDATIONS:    Ibuprofen 600 mg every six hours for pain (7 days duration).  Tylenol 1000 mg every six hours for pain (7 days duration).  Therefore, you can alternate these every three hours and do it safely.  ONLY take these medications if it is safe to do so given your medical history.    NURSE ADVICE LINE:  [phone] or [phone]

## 2024-01-01 NOTE — ED PROVIDER NOTES
HPI   Patient is a 38-year-old male presenting with a cough and congestion.  Symptoms began 3 days ago with fever, headache, myalgia, and coughing.  He has continued to have coughing with myalgia but the fever has resolved.  No headache.  He had mild nausea early on but no vomiting.  He has had diarrhea.  He has some central chest discomfort and pressure/burning.  He feels short of breath because of this.  Cough is dry.      Allergies:  No Known Allergies  Problem List:    Patient Active Problem List    Diagnosis Date Noted    ADHD (attention deficit hyperactivity disorder), inattentive type 02/15/2017     Priority: Medium     Patient is followed by ALHAJI MCKENZIE for ongoing prescription of stimulants.  All refills should be approved by this provider, or covering partner.    Medication(s): Adderal 40mg (2 20mg) XR   Maximum quantity per month: #60  Clinic visit frequency required: Q 3 months     Controlled substance agreement on file: Yes       Date(s): 4/10/18  Neuropsych evaluation for ADD completed:  Yes, completed 2/2017, on file and diagnosis confirmed    Last [facility] website verification:  done on 4/10/18   [email or web address]          Major depressive disorder with single episode, in full remission (H24) 04/07/2016     Priority: Medium      Past Medical History:    No past medical history on file.  Past Surgical History:    No past surgical history on file.  Family History:    Family History   Problem Relation Age of Onset    Breast Cancer Mother         reoccurance Oct. 2018    LUNG DISEASE Maternal Grandfather     Myocardial Infarction Paternal Grandmother     Prostate Cancer Paternal Grandfather      Social History:  Marital Status:  Single [1]  Social History     Tobacco Use    Smoking status: Former     Packs/day: 1     Types: Cigarettes    Smokeless tobacco: Former   Substance Use Topics    Alcohol use: No    Drug use: No      Medications:    oxyCODONE (ROXICODONE) 5 MG tablet  penicillin  V (VEETID) 500 MG tablet      Review of Systems   All other systems reviewed and are negative.      PE   BP: (!) 146/89  Pulse: 61  Temp: 97  F (36.1  C)  Resp: 16  Weight: 90.7 kg (200 lb)  SpO2: 98 %  Physical Exam  Vitals and nursing note reviewed.   Constitutional:       General: He is not in acute distress.  HENT:      Head: Atraumatic.      Right Ear: External ear normal.      Left Ear: External ear normal.      Nose: Nose normal.      Mouth/Throat:      Mouth: Mucous membranes are moist.      Pharynx: Oropharynx is clear.   Eyes:      General: No scleral icterus.     Extraocular Movements: Extraocular movements intact.      Conjunctiva/sclera: Conjunctivae normal.      Pupils: Pupils are equal, round, and reactive to light.   Cardiovascular:      Rate and Rhythm: Normal rate.   Pulmonary:      Effort: Pulmonary effort is normal. No respiratory distress.   Musculoskeletal:         General: Normal range of motion.      Cervical back: Normal range of motion.   Skin:     General: Skin is warm and dry.   Neurological:      Mental Status: He is alert and oriented to person, place, and time.   Psychiatric:         Behavior: Behavior normal.         ED COURSE and MDM   1130.  Patient has symptoms and signs as described above.  Multiple symptoms crossing multiple system boundaries, consistent with a viral illness.  Respiratory swab pending.  No chest x-ray at this point unless viral swabs are negative.    1228.  Influenza A positive.  Ibuprofen and Tylenol recommended.  Return for worsening as discussed.    Electronic medical chart reviewed, including medical problems, medications, medical allergies, social history.  Recent hospitalizations and surgical procedures reviewed.  Recent clinic visits and consultations reviewed.  Recent labs and test results reviewed.  Nursing notes reviewed.    The patient, their parent if applicable, and/or their medical decision maker(s) and I have reviewed all of the available  historical information, applicable PMH, physical exam findings, and objective diagnostic data gathered during this ED visit.  We then discussed all work-up options and then together agreed upon the course taken during this visit.  The ultimate disposition and plan was a cooperative decision made between myself and the patient, their parent if applicable, and/or their legal decision maker(s).  The risks and benefits of all decisions made during this visit were discussed to the best of my abilities given the circumstances, and all parties are understanding of the pertinent ramifications of these decisions.      LABS  Labs Ordered and Resulted from Time of ED Arrival to Time of ED Departure   INFLUENZA A/B, RSV, & SARS-COV2 PCR - Abnormal       Result Value    Influenza A PCR Positive (*)     Influenza B PCR Negative      RSV PCR Negative      SARS CoV2 PCR Negative         IMAGING  Images reviewed by me.  Radiology report also reviewed.  No orders to display       Procedures    Medications - No data to display      IMPRESSION       ICD-10-CM    1. Influenza A  J10.1                Medication List      There are no discharge medications for this visit.                       Mickey Cornejo MD  01/01/24 8100

## 2024-01-01 NOTE — ED NOTES
As of Friday, patient has had a cough, fever and burning in the chest with deep breaths.  Pt states he wheezes sometimes.  Had a headache yesterday, not today.  Complains of body aches.

## 2024-06-20 ENCOUNTER — HOSPITAL ENCOUNTER (EMERGENCY)
Facility: CLINIC | Age: 39
Discharge: HOME | End: 2024-06-20
Payer: COMMERCIAL

## 2024-06-20 VITALS
OXYGEN SATURATION: 100 % | TEMPERATURE: 98.2 F | RESPIRATION RATE: 24 BRPM | DIASTOLIC BLOOD PRESSURE: 100 MMHG | HEART RATE: 52 BPM | SYSTOLIC BLOOD PRESSURE: 171 MMHG

## 2024-06-20 DIAGNOSIS — S61.210A LACERATION OF RIGHT INDEX FINGER WITHOUT FOREIGN BODY WITHOUT DAMAGE TO NAIL, INITIAL ENCOUNTER: ICD-10-CM

## 2024-06-20 PROCEDURE — 90471 IMMUNIZATION ADMIN: CPT

## 2024-06-20 PROCEDURE — 99213 OFFICE O/P EST LOW 20 MIN: CPT | Mod: 25

## 2024-06-20 PROCEDURE — 12001 RPR S/N/AX/GEN/TRNK 2.5CM/<: CPT

## 2024-06-20 PROCEDURE — 90715 TDAP VACCINE 7 YRS/> IM: CPT | Mod: JZ

## 2024-06-20 PROCEDURE — G0463 HOSPITAL OUTPT CLINIC VISIT: HCPCS | Mod: 25

## 2024-06-20 PROCEDURE — 250N000011 HC RX IP 250 OP 636: Mod: JZ

## 2024-06-20 RX ADMIN — CLOSTRIDIUM TETANI TOXOID ANTIGEN (FORMALDEHYDE INACTIVATED), CORYNEBACTERIUM DIPHTHERIAE TOXOID ANTIGEN (FORMALDEHYDE INACTIVATED), BORDETELLA PERTUSSIS TOXOID ANTIGEN (GLUTARALDEHYDE INACTIVATED), BORDETELLA PERTUSSIS FILAMENTOUS HEMAGGLUTININ ANTIGEN (FORMALDEHYDE INACTIVATED), BORDETELLA PERTUSSIS PERTACTIN ANTIGEN, AND BORDETELLA PERTUSSIS FIMBRIAE 2/3 ANTIGEN 0.5 ML: 5; 2; 2.5; 5; 3; 5 INJECTION, SUSPENSION INTRAMUSCULAR at 16:43

## 2024-06-20 ASSESSMENT — COLUMBIA-SUICIDE SEVERITY RATING SCALE - C-SSRS
1. IN THE PAST MONTH, HAVE YOU WISHED YOU WERE DEAD OR WISHED YOU COULD GO TO SLEEP AND NOT WAKE UP?: NO
6. HAVE YOU EVER DONE ANYTHING, STARTED TO DO ANYTHING, OR PREPARED TO DO ANYTHING TO END YOUR LIFE?: NO
2. HAVE YOU ACTUALLY HAD ANY THOUGHTS OF KILLING YOURSELF IN THE PAST MONTH?: NO

## 2024-06-20 ASSESSMENT — ACTIVITIES OF DAILY LIVING (ADL): ADLS_ACUITY_SCORE: 35

## 2024-06-20 NOTE — ED PROVIDER NOTES
History     Chief Complaint   Patient presents with    Laceration     Right hand , second digit laceration using a  today      HPI  Jona Zafar is a 39 year old male who presents for evaluation of a laceration to right index finger that occurred today while working.  He was using a hedge tremor when he accidentally sliced the right index finger earlier today.  Bleeding well-controlled.  No other injuries.  Describes minimal pain at the area, no limitation in ROM, numbness or tingling.  Not up-to-date on tetanus.    Allergies:  No Known Allergies    Problem List:    Patient Active Problem List    Diagnosis Date Noted    ADHD (attention deficit hyperactivity disorder), inattentive type 02/15/2017     Priority: Medium     Patient is followed by ALHAJI MCKENZIE for ongoing prescription of stimulants.  All refills should be approved by this provider, or covering partner.    Medication(s): Adderal 40mg (2 20mg) XR   Maximum quantity per month: #60  Clinic visit frequency required: Q 3 months     Controlled substance agreement on file: Yes       Date(s): 4/10/18  Neuropsych evaluation for ADD completed:  Yes, completed 2/2017, on file and diagnosis confirmed    Last [facility] website verification:  done on 4/10/18   [email or web address]          Major depressive disorder with single episode, in full remission (H24) 04/07/2016     Priority: Medium        Past Medical History:    No past medical history on file.    Past Surgical History:    No past surgical history on file.    Family History:    Family History   Problem Relation Age of Onset    Breast Cancer Mother         reoccurance Oct. 2018    LUNG DISEASE Maternal Grandfather     Myocardial Infarction Paternal Grandmother     Prostate Cancer Paternal Grandfather        Social History:  Marital Status:  Single [1]  Social History     Tobacco Use    Smoking status: Former     Current packs/day: 1.00     Types: Cigarettes    Smokeless tobacco:  Former   Substance Use Topics    Alcohol use: No    Drug use: No        Medications:    oxyCODONE (ROXICODONE) 5 MG tablet  penicillin V (VEETID) 500 MG tablet          Review of Systems  Pertinent review of systems as documented per HPI above.    Physical Exam   BP: (!) 171/100  Pulse: 52  Temp: 98.2  F (36.8  C)  Resp: 24  SpO2: 100 %      Physical Exam  Vitals and nursing note reviewed.   Constitutional:       General: He is not in acute distress.     Appearance: Normal appearance. He is not ill-appearing, toxic-appearing or diaphoretic.   HENT:      Head: Atraumatic.      Mouth/Throat:      Mouth: Mucous membranes are moist.   Eyes:      Extraocular Movements: Extraocular movements intact.      Conjunctiva/sclera: Conjunctivae normal.   Cardiovascular:      Rate and Rhythm: Normal rate.   Pulmonary:      Effort: Pulmonary effort is normal. No respiratory distress.      Breath sounds: Normal breath sounds.   Musculoskeletal:      Right wrist: Normal.      Right hand: Laceration and tenderness present. No swelling or bony tenderness. Normal range of motion. Normal strength. Normal sensation. Normal capillary refill. Normal pulse.      Comments: Approximately 2 cm laceration to right index finger on palmar side overlying the PIP joint.  No muscle or tendon involvement.  No limitation in ROM at the joint.  Full resisted and active strength.   Skin:     General: Skin is warm and dry.      Capillary Refill: Capillary refill takes less than 2 seconds.   Neurological:      General: No focal deficit present.      Mental Status: He is alert and oriented to person, place, and time.   Psychiatric:         Mood and Affect: Mood normal.         Behavior: Behavior normal.         ED [facility]    -Laceration Repair    Date/Time: 6/20/2024 4:41 PM    Performed by: Rianna Rick PA-C  Authorized by: Rianna Rick PA-C    Risks, benefits and alternatives  discussed.      ANESTHESIA (see MAR for exact dosages):     Anesthesia method:  Local infiltration    Local anesthetic:  Bupivacaine 0.5% w/o epi  LACERATION DETAILS     Location:  Finger    Finger location:  R index finger    Length (cm):  2    REPAIR TYPE:     Repair type:  Simple    EXPLORATION:     Hemostasis achieved with:  Direct pressure    Wound extent: no nerve damage and no tendon damage      Contaminated: yes      TREATMENT:     Area cleansed with:  Cristofer    Amount of cleaning:  Standard    Irrigation solution:  Sterile water    Irrigation method:  Pressure wash    SKIN REPAIR     Repair method:  Sutures    Suture size:  3-0    Wound skin closure material used: Ethillon.    Suture technique:  Simple interrupted    Number of sutures:  6    APPROXIMATION     Approximation:  Close    POST-PROCEDURE DETAILS     Dressing:  Antibiotic ointment, tube gauze and splint for protection      PROCEDURE    Patient Tolerance:  Patient tolerated the procedure well with no immediate complications      Medications   Tdap (tetanus-diphtheria-acell pertussis) (ADACEL) injection 0.5 mL (0.5 mLs Intramuscular $Given 6/20/24 2952)       Assessments & Plan (with Medical Decision Making)     I have reviewed the nursing notes.    I have reviewed the findings, diagnosis, plan and need for follow up with the patient.  39 year old male who presents for evaluation of a laceration to right index finger that occurred today while working.  He was using a hedge tremor when he accidentally sliced the right index finger earlier today.  Bleeding well-controlled.  No other injuries.  Describes minimal pain at the area, no limitation in ROM, numbness or tingling.  Not up-to-date on tetanus.    On exam, patient well-appearing, afebrile with VS WNL aside from initial hypertension.  There is a 2 cm laceration to the right index finger on the palmar side overlying the PIP joint.  No tendon or muscle involvement.  No limitation in ROM at the  joint.  Full active and resisted strength.  Rest of exam as above.  The laceration was cleansed, repaired and dressed as described in procedure note above and patient tolerated the procedure well.  Tetanus updated today.  Discussed aftercare instructions with patient including presenting for suture removal in 10 days.  Discussed signs of infection including increased pain, swelling, redness, or puslike drainage and advised to return sooner should he notice any of these.  All questions answered.  Patient verbalizes understanding and agreement with the above plan.    Disclaimer: This note consists of symbols derived from keyboarding, dictation, and/or voice recognition software. As a result, there may be errors in the script that have gone undetected.  Please consider this when interpreting information found in the chart.      Discharge Medication List as of 6/20/2024  4:51 PM          Final diagnoses:   Laceration of right index finger without foreign body without damage to nail, initial encounter       6/20/2024   [facility] EMERGENCY DEPT       Rianna Rick PA-C  06/21/24 [id number]

## 2024-06-20 NOTE — DISCHARGE INSTRUCTIONS
You were seen today for a laceration to right index finger.  I closed this with 6 digits.  We updated your tetanus today.  Keep this dressing on for the next 24 hours, afterwards try to wear the splint is much as you can to avoid bending the finger and disrupting the stitches.  I recommend removal in 10 days.  Watch for signs of infection including increased pain, swelling, redness or puslike drainage.

## 2024-07-01 ENCOUNTER — HOSPITAL ENCOUNTER (EMERGENCY)
Facility: CLINIC | Age: 39
Discharge: HOME | End: 2024-07-01
Admitting: EMERGENCY MEDICINE
Payer: COMMERCIAL

## 2024-07-01 PROCEDURE — 999N000104 HC STATISTIC NO CHARGE

## 2024-07-02 NOTE — ED TRIAGE NOTES
6 Sutures removed from finger, wound healing well. No signs or symptoms of infection. Patient advised to follow up with PCP for further concerns.  Harini Pina CMA